# Patient Record
Sex: FEMALE | Race: BLACK OR AFRICAN AMERICAN | NOT HISPANIC OR LATINO | Employment: UNEMPLOYED | URBAN - METROPOLITAN AREA
[De-identification: names, ages, dates, MRNs, and addresses within clinical notes are randomized per-mention and may not be internally consistent; named-entity substitution may affect disease eponyms.]

---

## 2021-05-26 ENCOUNTER — HISTORICAL (OUTPATIENT)
Dept: ADMINISTRATIVE | Facility: HOSPITAL | Age: 26
End: 2021-05-26

## 2021-05-26 LAB
BILIRUB SERPL-MCNC: NEGATIVE MG/DL
BLOOD URINE, POC: NORMAL
CLARITY, POC UA: CLEAR
COLOR, POC UA: NORMAL
GLUCOSE UR QL STRIP: NEGATIVE
KETONES UR QL STRIP: NEGATIVE
LEUKOCYTE EST, POC UA: NEGATIVE
NITRITE, POC UA: NEGATIVE
PH, POC UA: 7
POC BETA-HCG (QUAL): NEGATIVE
PROTEIN, POC: NEGATIVE
SPECIFIC GRAVITY, POC UA: 1.02
UROBILINOGEN, POC UA: NORMAL

## 2021-09-05 ENCOUNTER — HISTORICAL (OUTPATIENT)
Dept: ADMINISTRATIVE | Facility: HOSPITAL | Age: 26
End: 2021-09-05

## 2021-09-05 LAB — SARS-COV-2 RNA RESP QL NAA+PROBE: NOT DETECTED

## 2022-04-10 ENCOUNTER — HISTORICAL (OUTPATIENT)
Dept: ADMINISTRATIVE | Facility: HOSPITAL | Age: 27
End: 2022-04-10

## 2022-04-26 VITALS
OXYGEN SATURATION: 100 % | BODY MASS INDEX: 41.02 KG/M2 | HEIGHT: 63 IN | SYSTOLIC BLOOD PRESSURE: 142 MMHG | DIASTOLIC BLOOD PRESSURE: 86 MMHG | WEIGHT: 231.5 LBS

## 2022-11-16 LAB
C TRACH DNA SPEC QL NAA+PROBE: NEGATIVE
PAP RECOMMENDATION EXT: NORMAL
PAP SMEAR: NORMAL

## 2022-12-19 ENCOUNTER — OFFICE VISIT (OUTPATIENT)
Dept: URGENT CARE | Facility: CLINIC | Age: 27
End: 2022-12-19
Payer: MEDICAID

## 2022-12-19 VITALS
HEART RATE: 97 BPM | SYSTOLIC BLOOD PRESSURE: 118 MMHG | DIASTOLIC BLOOD PRESSURE: 80 MMHG | WEIGHT: 250.38 LBS | BODY MASS INDEX: 44.36 KG/M2 | TEMPERATURE: 98 F | HEIGHT: 63 IN | RESPIRATION RATE: 18 BRPM | OXYGEN SATURATION: 100 %

## 2022-12-19 DIAGNOSIS — J06.9 ACUTE URI: Primary | ICD-10-CM

## 2022-12-19 DIAGNOSIS — R68.89 FLU-LIKE SYMPTOMS: ICD-10-CM

## 2022-12-19 DIAGNOSIS — R05.9 COUGH, UNSPECIFIED TYPE: ICD-10-CM

## 2022-12-19 DIAGNOSIS — Z11.52 ENCOUNTER FOR SCREENING FOR SEVERE ACUTE RESPIRATORY SYNDROME CORONAVIRUS 2 (SARS-COV-2) INFECTION: ICD-10-CM

## 2022-12-19 LAB
CTP QC/QA: YES
CTP QC/QA: YES
FLUAV AG NPH QL: NEGATIVE
FLUBV AG NPH QL: NEGATIVE
SARS-COV-2 RDRP RESP QL NAA+PROBE: NEGATIVE

## 2022-12-19 PROCEDURE — 87635 SARS-COV-2 COVID-19 AMP PRB: CPT | Mod: PBBFAC | Performed by: NURSE PRACTITIONER

## 2022-12-19 PROCEDURE — 99213 OFFICE O/P EST LOW 20 MIN: CPT | Mod: S$PBB,,, | Performed by: NURSE PRACTITIONER

## 2022-12-19 PROCEDURE — 87804 INFLUENZA ASSAY W/OPTIC: CPT | Mod: PBBFAC | Performed by: NURSE PRACTITIONER

## 2022-12-19 PROCEDURE — 99213 PR OFFICE/OUTPT VISIT, EST, LEVL III, 20-29 MIN: ICD-10-PCS | Mod: S$PBB,,, | Performed by: NURSE PRACTITIONER

## 2022-12-19 PROCEDURE — 99214 OFFICE O/P EST MOD 30 MIN: CPT | Mod: PBBFAC | Performed by: NURSE PRACTITIONER

## 2022-12-19 RX ORDER — FLUTICASONE PROPIONATE 50 MCG
2 SPRAY, SUSPENSION (ML) NASAL DAILY
Qty: 9.9 ML | Refills: 0 | Status: SHIPPED | OUTPATIENT
Start: 2022-12-19 | End: 2023-04-06

## 2022-12-19 RX ORDER — PROMETHAZINE HYDROCHLORIDE AND DEXTROMETHORPHAN HYDROBROMIDE 6.25; 15 MG/5ML; MG/5ML
5 SYRUP ORAL EVERY 6 HOURS PRN
Qty: 100 ML | Refills: 0 | Status: SHIPPED | OUTPATIENT
Start: 2022-12-19 | End: 2023-03-18

## 2022-12-19 NOTE — LETTER
December 19, 2022      [facility]  [address] [address]  Phone: [phone]       Patient: Janet Frank   YOB: 1995  Date of Visit: 12/19/2022    To Whom It May Concern:    Ubaldo Frank  was at [facility] on 12/19/2022. The patient may return to work/school on 12/21/22 with no restrictions. If you have any questions or concerns, or if I can be of further assistance, please do not hesitate to contact me.    Sincerely,    MICHELLE HERRERA

## 2022-12-19 NOTE — PROGRESS NOTES
"Subjective:       Patient ID: Janet Frank is a 27 y.o. female.    Vitals:  height is 5' 3" (1.6 m) and weight is 113.6 kg (250 lb 6.4 oz). Her oral temperature is 98.1 °F (36.7 °C). Her blood pressure is 118/80 and her pulse is 97. Her respiration is 18 and oxygen saturation is 100%.     Chief Complaint: Cough (x3days), Generalized Body Aches (x3days), Nausea (x3days), Headache (x3days), and Insomnia (x3days)    CC as above. Denies any chance of pregnancy, vomiting, diarrhea, abd pain.       Constitution: Negative.   HENT:  Positive for congestion.    Neck: neck negative.   Cardiovascular: Negative.    Respiratory:  Positive for cough.    Gastrointestinal:  Positive for nausea.     Objective:      Physical Exam   Constitutional: She is oriented to person, place, and time. She appears well-developed.   HENT:   Head: Normocephalic.   Ears:   Right Ear: Tympanic membrane normal.   Left Ear: Tympanic membrane normal.   Nose: Congestion present.   Mouth/Throat: Oropharynx is clear.   Eyes: Conjunctivae and EOM are normal. Pupils are equal, round, and reactive to light.   Neck: Neck supple.   Cardiovascular: Normal rate, regular rhythm and normal heart sounds.   Pulmonary/Chest: Effort normal and breath sounds normal.   Abdominal: Bowel sounds are normal. Soft. There is no abdominal tenderness. There is no rebound, no left CVA tenderness and no right CVA tenderness.   Musculoskeletal: Normal range of motion.         General: Normal range of motion.   Neurological: She is alert and oriented to person, place, and time.   Skin: Skin is warm and dry. Capillary refill takes less than 2 seconds.   Psychiatric: Her behavior is normal.   Vitals reviewed.      Assessment:       1. Acute URI    2. Encounter for screening for severe acute respiratory syndrome coronavirus 2 (SARS-CoV-2) infection    3. Flu-like symptoms    4. Cough, unspecified type              Office Visit on 12/19/2022   Component Date Value Ref Range Status    " POC Rapid COVID 12/19/2022 Negative  Negative Final     Acceptable 12/19/2022 Yes   Final    Rapid Influenza A Ag 12/19/2022 Negative  Negative Final    Rapid Influenza B Ag 12/19/2022 Negative  Negative Final     Acceptable 12/19/2022 Yes   Final        No results found.   Plan:         Medication as ordered. May use humidifier.  If any shortness of breath, wheezing, continued fevers or any new symptoms then immediately go to ER.      Acute URI  -     fluticasone propionate (FLONASE) 50 mcg/actuation nasal spray; 2 sprays (100 mcg total) by Each Nostril route once daily.  Dispense: 9.9 mL; Refill: 0    Encounter for screening for severe acute respiratory syndrome coronavirus 2 (SARS-CoV-2) infection  -     POCT COVID-19 Rapid Screening    Flu-like symptoms  -     POCT Influenza A/B    Cough, unspecified type  -     promethazine-dextromethorphan (PROMETHAZINE-DM) 6.25-15 mg/5 mL Syrp; Take 5 mLs by mouth every 6 (six) hours as needed (cough).  Dispense: 100 mL; Refill: 0

## 2023-03-18 ENCOUNTER — HOSPITAL ENCOUNTER (EMERGENCY)
Facility: HOSPITAL | Age: 28
Discharge: HOME | End: 2023-03-18
Attending: EMERGENCY MEDICINE
Payer: MEDICAID

## 2023-03-18 VITALS
DIASTOLIC BLOOD PRESSURE: 87 MMHG | WEIGHT: 251.13 LBS | RESPIRATION RATE: 17 BRPM | OXYGEN SATURATION: 99 % | BODY MASS INDEX: 44.5 KG/M2 | HEIGHT: 63 IN | HEART RATE: 97 BPM | TEMPERATURE: 97 F | SYSTOLIC BLOOD PRESSURE: 130 MMHG

## 2023-03-18 DIAGNOSIS — J06.9 VIRAL URI WITH COUGH: Primary | ICD-10-CM

## 2023-03-18 LAB
B-HCG UR QL: NEGATIVE
CTP QC/QA: YES
FLUAV AG UPPER RESP QL IA.RAPID: NOT DETECTED
FLUBV AG UPPER RESP QL IA.RAPID: NOT DETECTED
SARS-COV-2 RNA RESP QL NAA+PROBE: NOT DETECTED

## 2023-03-18 PROCEDURE — 99284 EMERGENCY DEPT VISIT MOD MDM: CPT

## 2023-03-18 PROCEDURE — 0240U COVID/FLU A&B PCR: CPT | Performed by: PHYSICIAN ASSISTANT

## 2023-03-18 PROCEDURE — 63600175 PHARM REV CODE 636 W HCPCS: Performed by: PHYSICIAN ASSISTANT

## 2023-03-18 PROCEDURE — 81025 URINE PREGNANCY TEST: CPT | Performed by: PHYSICIAN ASSISTANT

## 2023-03-18 PROCEDURE — 25000003 PHARM REV CODE 250: Performed by: PHYSICIAN ASSISTANT

## 2023-03-18 PROCEDURE — 96372 THER/PROPH/DIAG INJ SC/IM: CPT | Performed by: PHYSICIAN ASSISTANT

## 2023-03-18 RX ORDER — KETOROLAC TROMETHAMINE 30 MG/ML
30 INJECTION, SOLUTION INTRAMUSCULAR; INTRAVENOUS
Status: COMPLETED | OUTPATIENT
Start: 2023-03-18 | End: 2023-03-18

## 2023-03-18 RX ORDER — PROMETHAZINE HYDROCHLORIDE AND DEXTROMETHORPHAN HYDROBROMIDE 6.25; 15 MG/5ML; MG/5ML
5 SYRUP ORAL EVERY 6 HOURS PRN
Qty: 100 ML | Refills: 0 | Status: SHIPPED | OUTPATIENT
Start: 2023-03-18 | End: 2023-03-23

## 2023-03-18 RX ORDER — CETIRIZINE HYDROCHLORIDE 10 MG/1
10 TABLET ORAL DAILY
Qty: 30 TABLET | Refills: 0 | Status: SHIPPED | OUTPATIENT
Start: 2023-03-18 | End: 2023-04-06

## 2023-03-18 RX ORDER — ONDANSETRON 4 MG/1
4 TABLET, ORALLY DISINTEGRATING ORAL
Status: COMPLETED | OUTPATIENT
Start: 2023-03-18 | End: 2023-03-18

## 2023-03-18 RX ORDER — INDOMETHACIN 50 MG/1
50 CAPSULE ORAL 3 TIMES DAILY PRN
Qty: 20 CAPSULE | Refills: 0 | Status: SHIPPED | OUTPATIENT
Start: 2023-03-18 | End: 2023-04-06

## 2023-03-18 RX ADMIN — KETOROLAC TROMETHAMINE 30 MG: 30 INJECTION, SOLUTION INTRAMUSCULAR at 11:03

## 2023-03-18 RX ADMIN — ONDANSETRON 4 MG: 4 TABLET, ORALLY DISINTEGRATING ORAL at 11:03

## 2023-03-18 NOTE — DISCHARGE INSTRUCTIONS
Report to Emergency Department if symptoms return or worsen; [facility] Within 1 to 2 days, It is important that you follow up with your primary care provider or specialist if indicated for further evaluation, workup, and treatment as necessary. The exam and treatment you received in Emergency Department was for an urgent problem and NOT INTENDED AS COMPLETE CARE. It is important that you FOLLOW UP with a doctor for ongoing care. If your symptoms become WORSE or you DO NOT IMPROVE and you are unable to reach your health care provider, you should RETURN to the Emergency Department. The Emergency Department provider has provided a PRELIMINARY INTERPRETATION of all your studies. A final interpretation may be done after you are discharged. If a change in your diagnosis or treatment is needed WE WILL CONTACT YOU. It is critical that we have a CURRENT PHONE NUMBER FOR YOU.

## 2023-03-18 NOTE — Clinical Note
"Janet Harrisonjona Frank was seen and treated in our emergency department on 3/18/2023.  She may return to work on 03/21/2023.       If you have any questions or concerns, please don't hesitate to call.      LUCAS Molina"

## 2023-03-18 NOTE — ED PROVIDER NOTES
"Encounter Date: 3/18/2023       History     Chief Complaint   Patient presents with    Nasal Congestion     Patient in with c/o nasal congestion, headache, body aches, and "vomiting yellow". Afebrile. No resp distress noted. Mild cough present.     26 yo F w/ PMHx significant for smoking presents to ED c/o 1 day hx of congestion, rhinorrhea, post-nasal drainage, productive cough, HA, body aches & N/V. Denies known sick contacts. Has not tried any meds at home. Denies abdominal pain, diarrhea, constipation, blood in stool, jaundice, dysuria, hematuria, vaginal bleeding, vaginal discharge, sore throat, hemoptysis, wheezing, SOB, vision changes, HA, focal weakness, numbness, F/C, generalized weakness, fatigue, appetite changes, back pain. VSS on arrival, patient in NAD.    Review of patient's allergies indicates:   Allergen Reactions    Benadryl allergy decongestant Hives     No past medical history on file.  No past surgical history on file.  No family history on file.  Social History     Tobacco Use    Smoking status: Every Day     Types: Vaping with nicotine    Smokeless tobacco: Never   Substance Use Topics    Alcohol use: Not Currently    Drug use: Never     Review of Systems   HENT:  Positive for sinus pressure. Negative for ear discharge and ear pain.    Cardiovascular:  Negative for chest pain.   Musculoskeletal:  Negative for neck pain and neck stiffness.   Skin:  Negative for color change, pallor and rash.   Allergic/Immunologic: Negative for immunocompromised state.   Psychiatric/Behavioral:  Negative for confusion.    All other systems reviewed and are negative.    Physical Exam     Initial Vitals [03/18/23 1015]   BP Pulse Resp Temp SpO2   130/87 97 17 97 °F (36.1 °C) 99 %      MAP       --         Physical Exam    Nursing note and vitals reviewed.  Constitutional: She appears well-developed and well-nourished. She is not diaphoretic. No distress.   HENT:   Head: Normocephalic and atraumatic.   Right Ear: " Hearing, tympanic membrane, external ear and ear canal normal.   Left Ear: Hearing, tympanic membrane, external ear and ear canal normal.   Nose: Mucosal edema and rhinorrhea present. No epistaxis. Right sinus exhibits maxillary sinus tenderness. Right sinus exhibits no frontal sinus tenderness. Left sinus exhibits maxillary sinus tenderness. Left sinus exhibits no frontal sinus tenderness.   Mouth/Throat: Uvula is midline, oropharynx is clear and moist and mucous membranes are normal. No trismus in the jaw. No uvula swelling. No oropharyngeal exudate, posterior oropharyngeal edema, posterior oropharyngeal erythema or tonsillar abscesses.   Eyes: Conjunctivae and EOM are normal. Pupils are equal, round, and reactive to light. No scleral icterus.   Neck: Neck supple. No JVD present.   Normal range of motion.   Full passive range of motion without pain.     Cardiovascular:  Normal rate, regular rhythm, normal heart sounds and intact distal pulses.     Exam reveals no gallop and no friction rub.       No murmur heard.  Pulmonary/Chest: Breath sounds normal. No stridor. No respiratory distress. She has no wheezes. She has no rhonchi. She has no rales.   Abdominal: Abdomen is soft. Bowel sounds are normal. She exhibits no distension and no mass. There is no abdominal tenderness.   No right CVA tenderness.  No left CVA tenderness. There is no rebound, no guarding, no tenderness at McBurney's point and negative Berrios's sign. negative Rovsing's sign  Musculoskeletal:         General: No tenderness or edema. Normal range of motion.      Cervical back: Full passive range of motion without pain, normal range of motion and neck supple. No rigidity.     Lymphadenopathy:     She has no cervical adenopathy.   Neurological: She is alert and oriented to person, place, and time. She has normal strength. No cranial nerve deficit or sensory deficit.   Skin: Skin is warm and dry. Capillary refill takes less than 2 seconds. No rash  noted. No erythema. No pallor.   Psychiatric: She has a normal mood and affect. Thought content normal.       ED Course   Procedures  Labs Reviewed   COVID/FLU A&B PCR - Normal    Narrative:     The Xpert Xpress SARS-CoV-2/FLU/RSV plus is a rapid, multiplexed real-time PCR test intended for the simultaneous qualitative detection and differentiation of SARS-CoV-2, Influenza A, Influenza B, and respiratory syncytial virus (RSV) viral RNA in either nasopharyngeal swab or nasal swab specimens.         POCT URINE PREGNANCY          Imaging Results    None          Medications   ketorolac injection 30 mg (30 mg Intramuscular Given 3/18/23 1111)   ondansetron disintegrating tablet 4 mg (4 mg Oral Given 3/18/23 1111)     Medical Decision Making:   Clinical Tests:   Lab Tests: Ordered and Reviewed  All swabs negative. Lungs CTA throughout w/o signs of respiratory distress or abnormal vitals, no indication for CXR at this time. Patient is non-toxic appearing, stable for discharge. Will discharge w/ meds for symptom relief. Referral to IM clinic for follow-up. ED precautions given for new or worsening symptoms.                        Clinical Impression:   Final diagnoses:  [J06.9] Viral URI with cough (Primary)        ED Disposition Condition    Discharge Good          ED Prescriptions       Medication Sig Dispense Start Date End Date Auth. Provider    indomethacin (INDOCIN) 50 MG capsule Take 1 capsule (50 mg total) by mouth 3 (three) times daily as needed (Pain). Take with food 20 capsule 3/18/2023 -- LUCAS Molina    promethazine-dextromethorphan (PROMETHAZINE-DM) 6.25-15 mg/5 mL Syrp Take 5 mLs by mouth every 6 (six) hours as needed (cough). 100 mL 3/18/2023 3/23/2023 LUCAS Molina    cetirizine (ZYRTEC) 10 MG tablet Take 1 tablet (10 mg total) by mouth once daily. 30 tablet 3/18/2023 3/17/2024 LUCAS Molina          Follow-up Information       Follow up With Specialties Details Why Contact Info Additional  Information    [facility] Internal Medicine In 2 weeks  [address] [address] [phone] Internal Medicine Clinic Entrance #1    [facility] Emergency Medicine  As needed, If symptoms worsen [address] [address] [phone]              LUCAS Molina  03/18/23 1215

## 2023-04-06 ENCOUNTER — HOSPITAL ENCOUNTER (OUTPATIENT)
Dept: RADIOLOGY | Facility: HOSPITAL | Age: 28
Discharge: HOME | End: 2023-04-06
Attending: NURSE PRACTITIONER
Payer: MEDICAID

## 2023-04-06 ENCOUNTER — OFFICE VISIT (OUTPATIENT)
Dept: URGENT CARE | Facility: CLINIC | Age: 28
End: 2023-04-06
Payer: MEDICAID

## 2023-04-06 VITALS
TEMPERATURE: 98 F | OXYGEN SATURATION: 100 % | HEART RATE: 91 BPM | HEIGHT: 63 IN | WEIGHT: 253 LBS | RESPIRATION RATE: 18 BRPM | DIASTOLIC BLOOD PRESSURE: 80 MMHG | SYSTOLIC BLOOD PRESSURE: 115 MMHG | BODY MASS INDEX: 44.83 KG/M2
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
## 2023-04-06 LAB
B-HCG UR QL: NEGATIVE
CTP QC/QA: YES

## 2023-04-06 PROCEDURE — 73030 X-RAY EXAM OF SHOULDER: CPT | Mod: TC,RT

## 2023-04-06 PROCEDURE — 73000 X-RAY EXAM OF COLLAR BONE: CPT | Mod: TC,RT

## 2023-04-06 PROCEDURE — 81025 URINE PREGNANCY TEST: CPT | Mod: PBBFAC | Performed by: NURSE PRACTITIONER

## 2023-04-06 PROCEDURE — 99213 PR OFFICE/OUTPT VISIT, EST, LEVL III, 20-29 MIN: ICD-10-PCS | Mod: S$PBB,,, | Performed by: NURSE PRACTITIONER

## 2023-04-06 PROCEDURE — 99214 OFFICE O/P EST MOD 30 MIN: CPT | Mod: PBBFAC | Performed by: NURSE PRACTITIONER

## 2023-04-06 PROCEDURE — 99213 OFFICE O/P EST LOW 20 MIN: CPT | Mod: S$PBB,,, | Performed by: NURSE PRACTITIONER

## 2023-04-06 RX ORDER — DICLOFENAC SODIUM 75 MG/1
75 TABLET, DELAYED RELEASE ORAL 2 TIMES DAILY
Qty: 20 TABLET | Refills: 0 | Status: SHIPPED | OUTPATIENT
Start: 2023-04-06 | End: 2023-04-24

## 2023-04-06 RX ORDER — METHOCARBAMOL 500 MG/1
500 TABLET, FILM COATED ORAL 4 TIMES DAILY
Qty: 40 TABLET | Refills: 0 | Status: SHIPPED | OUTPATIENT
Start: 2023-04-06 | End: 2023-04-24

## 2023-04-06 NOTE — PATIENT INSTRUCTIONS
You are having whiplash. This is a muscle strain. It will usually feel worse before it feels better.  Use Rx meds as directed.  Practice light range of motion often.  Apply heat.  Epsom salt soak.  You may take up to 1,000mg Tylenol at one time up to 4x in 24 hours.
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
## 2023-04-24 ENCOUNTER — OFFICE VISIT (OUTPATIENT)
Dept: INTERNAL MEDICINE | Facility: CLINIC | Age: 28
End: 2023-04-24
Payer: MEDICAID

## 2023-04-24 VITALS
BODY MASS INDEX: 44.94 KG/M2 | TEMPERATURE: 98 F | HEART RATE: 86 BPM | WEIGHT: 253.63 LBS | DIASTOLIC BLOOD PRESSURE: 84 MMHG | SYSTOLIC BLOOD PRESSURE: 122 MMHG | RESPIRATION RATE: 18 BRPM | HEIGHT: 63 IN

## 2023-04-24 DIAGNOSIS — E66.01 CLASS 3 SEVERE OBESITY DUE TO EXCESS CALORIES WITH SERIOUS COMORBIDITY AND BODY MASS INDEX (BMI) OF 40.0 TO 44.9 IN ADULT: Chronic | ICD-10-CM

## 2023-04-24 DIAGNOSIS — K21.9 GASTROESOPHAGEAL REFLUX DISEASE, UNSPECIFIED WHETHER ESOPHAGITIS PRESENT: Chronic | ICD-10-CM

## 2023-04-24 DIAGNOSIS — Z00.00 WELLNESS EXAMINATION: ICD-10-CM

## 2023-04-24 DIAGNOSIS — G89.29 CHRONIC NONINTRACTABLE HEADACHE, UNSPECIFIED HEADACHE TYPE: Primary | ICD-10-CM

## 2023-04-24 DIAGNOSIS — R51.9 CHRONIC NONINTRACTABLE HEADACHE, UNSPECIFIED HEADACHE TYPE: Primary | ICD-10-CM

## 2023-04-24 PROBLEM — E66.813 CLASS 3 SEVERE OBESITY DUE TO EXCESS CALORIES WITH SERIOUS COMORBIDITY AND BODY MASS INDEX (BMI) OF 40.0 TO 44.9 IN ADULT: Chronic | Status: ACTIVE | Noted: 2023-04-24

## 2023-04-24 LAB
APPEARANCE UR: CLEAR
BACTERIA #/AREA URNS AUTO: ABNORMAL /HPF
BILIRUB UR QL STRIP.AUTO: NEGATIVE MG/DL
COLOR UR AUTO: ABNORMAL
GLUCOSE UR QL STRIP.AUTO: NORMAL MG/DL
HYALINE CASTS #/AREA URNS LPF: ABNORMAL /LPF
KETONES UR QL STRIP.AUTO: NEGATIVE MG/DL
LEUKOCYTE ESTERASE UR QL STRIP.AUTO: NEGATIVE UNIT/L
MUCOUS THREADS URNS QL MICRO: ABNORMAL /LPF
NITRITE UR QL STRIP.AUTO: NEGATIVE
PH UR STRIP.AUTO: 5.5 [PH]
PROT UR QL STRIP.AUTO: NEGATIVE MG/DL
RBC #/AREA URNS AUTO: ABNORMAL /HPF
RBC UR QL AUTO: NEGATIVE UNIT/L
SP GR UR STRIP.AUTO: 1.03
SQUAMOUS #/AREA URNS LPF: ABNORMAL /HPF
UROBILINOGEN UR STRIP-ACNC: NORMAL MG/DL
WBC #/AREA URNS AUTO: ABNORMAL /HPF

## 2023-04-24 PROCEDURE — 3008F PR BODY MASS INDEX (BMI) DOCUMENTED: ICD-10-PCS | Mod: CPTII,,, | Performed by: NURSE PRACTITIONER

## 2023-04-24 PROCEDURE — 3074F PR MOST RECENT SYSTOLIC BLOOD PRESSURE < 130 MM HG: ICD-10-PCS | Mod: CPTII,,, | Performed by: NURSE PRACTITIONER

## 2023-04-24 PROCEDURE — 99214 OFFICE O/P EST MOD 30 MIN: CPT | Mod: S$PBB,,, | Performed by: NURSE PRACTITIONER

## 2023-04-24 PROCEDURE — 1160F RVW MEDS BY RX/DR IN RCRD: CPT | Mod: CPTII,,, | Performed by: NURSE PRACTITIONER

## 2023-04-24 PROCEDURE — 1159F PR MEDICATION LIST DOCUMENTED IN MEDICAL RECORD: ICD-10-PCS | Mod: CPTII,,, | Performed by: NURSE PRACTITIONER

## 2023-04-24 PROCEDURE — 1159F MED LIST DOCD IN RCRD: CPT | Mod: CPTII,,, | Performed by: NURSE PRACTITIONER

## 2023-04-24 PROCEDURE — 3079F DIAST BP 80-89 MM HG: CPT | Mod: CPTII,,, | Performed by: NURSE PRACTITIONER

## 2023-04-24 PROCEDURE — 1160F PR REVIEW ALL MEDS BY PRESCRIBER/CLIN PHARMACIST DOCUMENTED: ICD-10-PCS | Mod: CPTII,,, | Performed by: NURSE PRACTITIONER

## 2023-04-24 PROCEDURE — 81001 URINALYSIS AUTO W/SCOPE: CPT | Performed by: NURSE PRACTITIONER

## 2023-04-24 PROCEDURE — 3074F SYST BP LT 130 MM HG: CPT | Mod: CPTII,,, | Performed by: NURSE PRACTITIONER

## 2023-04-24 PROCEDURE — 3079F PR MOST RECENT DIASTOLIC BLOOD PRESSURE 80-89 MM HG: ICD-10-PCS | Mod: CPTII,,, | Performed by: NURSE PRACTITIONER

## 2023-04-24 PROCEDURE — 99214 PR OFFICE/OUTPT VISIT, EST, LEVL IV, 30-39 MIN: ICD-10-PCS | Mod: S$PBB,,, | Performed by: NURSE PRACTITIONER

## 2023-04-24 PROCEDURE — 99214 OFFICE O/P EST MOD 30 MIN: CPT | Mod: PBBFAC | Performed by: NURSE PRACTITIONER

## 2023-04-24 PROCEDURE — 3008F BODY MASS INDEX DOCD: CPT | Mod: CPTII,,, | Performed by: NURSE PRACTITIONER

## 2023-04-24 RX ORDER — SUMATRIPTAN SUCCINATE 25 MG/1
25 TABLET ORAL EVERY 6 HOURS PRN
Qty: 60 TABLET | Refills: 2 | Status: SHIPPED | OUTPATIENT
Start: 2023-04-24

## 2023-04-24 RX ORDER — PANTOPRAZOLE SODIUM 40 MG/1
40 TABLET, DELAYED RELEASE ORAL DAILY
Qty: 90 TABLET | Refills: 1 | Status: SHIPPED | OUTPATIENT
Start: 2023-04-24 | End: 2024-02-08

## 2023-04-24 NOTE — PROGRESS NOTES
Patient ID: [id number]     Chief Complaint: Establish Care, Gastroesophageal Reflux, and Headache (States has heartburn for years.)    HPI:     Janet Frank is a 27 y.o. female with diagnosis of chronic headaches, obesity. Patient seen in clinic today to establish care.   Patient states chronic headaches since she was 17 y/o, was followed by Neurologist at [facility] but was unable to find out cause of headaches. Patient states headaches daily, states headaches top/side of head and radiates to back of head. Patient denies N/V/change in vision. States taking Ibuprofen with mild relief. Patient states previous prescribed Fioricet with mild relief. Patient states previous MRI, CT completed at [facility] in [place], [place]. Will try to obtain records.   Patient also states heartburn, worse at night. Patient denies abdominal pain, N/V/D/C, weight loss.   Patient denies any other acute complaints.     Review of patient's allergies indicates:   Allergen Reactions    Benadryl allergy decongestant Hives     Breast Cancer Screening: deferred due to age  Cervical Cancer Screening: followed by [facility]  Colorectal Cancer Screening: deferred due to age  Diabetic Eye Exam: N/A  Diabetic Foot Exam: N/A  Lung Cancer Screening: N/A  Prostate Cancer Screening: N/A  AAA Screening: N/A  Osteoporosis Screening: deferred due to age  Medicare Wellness: N/A  Immunizations:   Immunization History   Administered Date(s) Administered    COVID-19, MRNA, LN-S, PF (Pfizer) (Purple Cap) 04/26/2021, 05/17/2021     History reviewed. No pertinent surgical history.    family history includes Breast cancer in her mother; Cervical cancer in her maternal grandmother; No Known Problems in her father.    Social History     Socioeconomic History    Marital status: Significant Other   Tobacco Use    Smoking status: Every Day     Types: Vaping w/o nicotine     Start date: 03/2022    Smokeless tobacco: Never   Substance and Sexual Activity    Alcohol use: Yes      "Comment: occassionally    Drug use: Never    Sexual activity: Yes     Partners: Male     Current Outpatient Medications   Medication Instructions    pantoprazole (PROTONIX) 40 mg, Oral, Daily    sumatriptan (IMITREX) 25 mg, Oral, Every 6 hours PRN       Subjective:     Review of Systems   Constitutional: Negative.    HENT: Negative.     Eyes: Negative.    Respiratory: Negative.     Cardiovascular: Negative.    Gastrointestinal: Negative.    Endocrine: Negative.    Genitourinary: Negative.    Musculoskeletal: Negative.    Skin: Negative.    Allergic/Immunologic: Negative.    Neurological:  Positive for headaches.   Hematological: Negative.    Psychiatric/Behavioral: Negative.       Objective:     Visit Vitals  /84 (BP Location: Right arm, Patient Position: Sitting, BP Method: Large (Automatic))   Pulse 86   Temp 97.7 °F (36.5 °C) (Oral)   Resp 18   Ht 5' 2.99" (1.6 m)   Wt 115 kg (253 lb 9.6 oz)   LMP 03/21/2023 (Exact Date)   BMI 44.93 kg/m²     Physical Exam  Vitals reviewed.   Constitutional:       Appearance: Normal appearance. She is obese.   HENT:      Head: Normocephalic and atraumatic.      Mouth/Throat:      Mouth: Mucous membranes are moist.      Pharynx: Oropharynx is clear.   Eyes:      Extraocular Movements: Extraocular movements intact.      Conjunctiva/sclera: Conjunctivae normal.      Pupils: Pupils are equal, round, and reactive to light.   Cardiovascular:      Rate and Rhythm: Normal rate and regular rhythm.      Heart sounds: Normal heart sounds.   Pulmonary:      Effort: Pulmonary effort is normal.      Breath sounds: Normal breath sounds.   Abdominal:      General: Bowel sounds are normal.   Musculoskeletal:         General: Normal range of motion.      Cervical back: Normal range of motion.   Skin:     General: Skin is warm and dry.   Neurological:      Mental Status: She is alert and oriented to person, place, and time.   Psychiatric:         Mood and Affect: Mood normal.         " Behavior: Behavior normal.       Labs Reviewed:     Hematology:  Lab Results   Component Value Date    WBC 10.4 04/24/2023    HGB 12.6 04/24/2023    HCT 38.6 04/24/2023     04/24/2023     Chemistry:  Lab Results   Component Value Date     04/24/2023    K 3.8 04/24/2023    CHLORIDE 105 04/24/2023    BUN 13.5 04/24/2023    CREATININE 0.77 04/24/2023    EGFRNORACEVR >60 04/24/2023    GLUCOSE 120 (H) 04/24/2023    CALCIUM 9.4 04/24/2023    ALKPHOS 72 04/24/2023    LABPROT 7.7 04/24/2023    ALBUMIN 3.7 04/24/2023    BILIDIR 0.1 10/05/2020    IBILI 0.10 10/05/2020    AST 16 04/24/2023    ALT 22 04/24/2023    RNUCPAKK55CV 9.7 (L) 04/24/2023      Lab Results   Component Value Date    HGBA1C 5.8 04/24/2023     Lipid Panel:  Lab Results   Component Value Date    CHOL 182 04/24/2023    HDL 33 (L) 04/24/2023    .00 04/24/2023    TRIG 128 04/24/2023    TOTALCHOLEST 6 (H) 04/24/2023     Thyroid:  Lab Results   Component Value Date    TSH 1.680 04/24/2023     Urine:  Lab Results   Component Value Date    APPEARANCEUA CLEAR 10/05/2020    PROTEINUA Negative 10/05/2020    LEUKOCYTESUR Negative 05/26/2021    RBCUA 76 (H) 03/02/2020    WBCUA 95 (H) 03/02/2020    BACTERIA 1+ (A) 03/02/2020    SQEPUA  (A) 06/29/2019    HYALINECASTS 0-2 (A) 06/29/2019        Assessment:       ICD-10-CM ICD-9-CM   1. Chronic nonintractable headache, unspecified headache type  R51.9 784.0    G89.29    2. Gastroesophageal reflux disease, unspecified whether esophagitis present  K21.9 530.81   3. Class 3 severe obesity due to excess calories with serious comorbidity and body mass index (BMI) of 40.0 to 44.9 in adult  E66.01 278.01    Z68.41 V85.41   4. Wellness examination  Z00.00 V70.0        Plan:     1. Chronic nonintractable headache, unspecified headache type  Start Imitrex 25 mg Q6h prn headaches    2. Gastroesophageal reflux disease, unspecified whether esophagitis present  Start Protonix 40 mg daily  Avoid spicy foods  Remain  in an upright position for at least 30 minutes after consuming meals    3. Class 3 severe obesity due to excess calories with serious comorbidity and body mass index (BMI) of 40.0 to 44.9 in adult  Body mass index is 44.93 kg/m².  Thyroid Stimulating Hormone   Date Value Ref Range Status   04/24/2023 1.680 0.350 - 4.940 uIU/mL Final     Vit D 25 OH   Date Value Ref Range Status   04/24/2023 9.7 (L) 30.0 - 80.0 ng/mL Final     Hemoglobin A1c   Date Value Ref Range Status   04/24/2023 5.8 <=7.0 % Final   Sleep Study: denies  Weight Loss Encouraged  Increase Physical Activity    - CBC Auto Differential; Future  - Comprehensive Metabolic Panel; Future  - Hemoglobin A1C; Future  - Lipid Panel; Future  - Urinalysis; Future  - TSH; Future  - Vitamin D; Future  - Urinalysis      Follow up in about 2 weeks (around 5/8/2023) for Labs, Virtual Visit. In addition to their scheduled follow up, the patient has also been instructed to follow up on as needed basis.     Mohini Sibley, MICHELLE

## 2023-04-28 ENCOUNTER — DOCUMENTATION ONLY (OUTPATIENT)
Dept: INTERNAL MEDICINE | Facility: CLINIC | Age: 28
End: 2023-04-28
Payer: MEDICAID

## 2023-06-27 ENCOUNTER — PATIENT MESSAGE (OUTPATIENT)
Dept: RESEARCH | Facility: HOSPITAL | Age: 28
End: 2023-06-27
Payer: MEDICAID

## 2023-07-11 ENCOUNTER — PATIENT MESSAGE (OUTPATIENT)
Dept: RESEARCH | Facility: HOSPITAL | Age: 28
End: 2023-07-11
Payer: MEDICAID

## 2023-07-19 ENCOUNTER — PATIENT MESSAGE (OUTPATIENT)
Dept: RESEARCH | Facility: HOSPITAL | Age: 28
End: 2023-07-19
Payer: MEDICAID

## 2023-10-17 RX ORDER — PANTOPRAZOLE SODIUM 40 MG/1
40 TABLET, DELAYED RELEASE ORAL DAILY
Qty: 90 TABLET | Refills: 1 | OUTPATIENT
Start: 2023-10-17

## 2023-11-28 ENCOUNTER — TELEPHONE (OUTPATIENT)
Dept: FAMILY MEDICINE | Facility: CLINIC | Age: 28
End: 2023-11-28
Payer: MEDICAID

## 2023-11-28 ENCOUNTER — HOSPITAL ENCOUNTER (EMERGENCY)
Facility: HOSPITAL | Age: 28
Discharge: HOME | End: 2023-11-28
Attending: INTERNAL MEDICINE
Payer: MEDICAID

## 2023-11-28 ENCOUNTER — E-VISIT (OUTPATIENT)
Dept: FAMILY MEDICINE | Facility: CLINIC | Age: 28
End: 2023-11-28
Payer: MEDICAID

## 2023-11-28 VITALS
HEART RATE: 67 BPM | OXYGEN SATURATION: 100 % | TEMPERATURE: 98 F | HEIGHT: 63 IN | WEIGHT: 239.88 LBS | RESPIRATION RATE: 18 BRPM | SYSTOLIC BLOOD PRESSURE: 139 MMHG | BODY MASS INDEX: 42.5 KG/M2 | DIASTOLIC BLOOD PRESSURE: 89 MMHG

## 2023-11-28 DIAGNOSIS — K52.9 GASTROENTERITIS: ICD-10-CM

## 2023-11-28 DIAGNOSIS — R10.9 ABDOMINAL PAIN, UNSPECIFIED ABDOMINAL LOCATION: Primary | ICD-10-CM

## 2023-11-28 DIAGNOSIS — R07.9 CHEST PAIN, UNSPECIFIED TYPE: Primary | ICD-10-CM

## 2023-11-28 LAB
ABS NEUT CALC (OHS): 4.8 X10(3)/MCL (ref 2.1–9.2)
ALBUMIN SERPL-MCNC: 3.9 G/DL (ref 3.5–5)
ALBUMIN/GLOB SERPL: 1 RATIO (ref 1.1–2)
ALP SERPL-CCNC: 61 UNIT/L (ref 40–150)
ALT SERPL-CCNC: 16 UNIT/L (ref 0–55)
APPEARANCE UR: CLEAR
AST SERPL-CCNC: 17 UNIT/L (ref 5–34)
B-HCG UR QL: NEGATIVE
BACTERIA #/AREA URNS AUTO: ABNORMAL /HPF
BASOPHILS NFR BLD MANUAL: 0.09 X10(3)/MCL (ref 0–0.2)
BASOPHILS NFR BLD MANUAL: 1 % (ref 0–2)
BILIRUB SERPL-MCNC: 0.4 MG/DL
BILIRUB UR QL STRIP.AUTO: NEGATIVE
BUN SERPL-MCNC: 10.4 MG/DL (ref 7–18.7)
CALCIUM SERPL-MCNC: 9 MG/DL (ref 8.4–10.2)
CHLORIDE SERPL-SCNC: 106 MMOL/L (ref 98–107)
CO2 SERPL-SCNC: 26 MMOL/L (ref 22–29)
COLOR UR AUTO: ABNORMAL
CREAT SERPL-MCNC: 0.79 MG/DL (ref 0.55–1.02)
CTP QC/QA: YES
EOSINOPHIL NFR BLD MANUAL: 0.19 X10(3)/MCL (ref 0–0.9)
EOSINOPHIL NFR BLD MANUAL: 2 % (ref 0–8)
ERYTHROCYTE [DISTWIDTH] IN BLOOD BY AUTOMATED COUNT: 14.8 % (ref 11.5–17)
GFR SERPLBLD CREATININE-BSD FMLA CKD-EPI: >60 MLS/MIN/1.73/M2
GLOBULIN SER-MCNC: 3.9 GM/DL (ref 2.4–3.5)
GLUCOSE SERPL-MCNC: 89 MG/DL (ref 74–100)
GLUCOSE UR QL STRIP.AUTO: NORMAL
HCT VFR BLD AUTO: 38.7 % (ref 37–47)
HGB BLD-MCNC: 12.5 G/DL (ref 12–16)
HOLD SPECIMEN: NORMAL
HYALINE CASTS #/AREA URNS LPF: ABNORMAL /LPF
KETONES UR QL STRIP.AUTO: NEGATIVE
LEUKOCYTE ESTERASE UR QL STRIP.AUTO: NEGATIVE
LIPASE SERPL-CCNC: 14 U/L
LYMPHOCYTES NFR BLD MANUAL: 3.86 X10(3)/MCL
LYMPHOCYTES NFR BLD MANUAL: 41 % (ref 13–40)
MCH RBC QN AUTO: 26.1 PG (ref 27–31)
MCHC RBC AUTO-ENTMCNC: 32.3 G/DL (ref 33–36)
MCV RBC AUTO: 80.8 FL (ref 80–94)
MONOCYTES NFR BLD MANUAL: 0.47 X10(3)/MCL (ref 0.1–1.3)
MONOCYTES NFR BLD MANUAL: 5 % (ref 2–11)
NEUTROPHILS NFR BLD MANUAL: 51 % (ref 47–80)
NITRITE UR QL STRIP.AUTO: NEGATIVE
NRBC BLD AUTO-RTO: 0 %
PH UR STRIP.AUTO: 7.5 [PH]
PLATELET # BLD AUTO: 264 X10(3)/MCL (ref 130–400)
PLATELET # BLD EST: ADEQUATE 10*3/UL
PMV BLD AUTO: 10.8 FL (ref 7.4–10.4)
POTASSIUM SERPL-SCNC: 4.1 MMOL/L (ref 3.5–5.1)
PROT SERPL-MCNC: 7.8 GM/DL (ref 6.4–8.3)
PROT UR QL STRIP.AUTO: NEGATIVE
RBC # BLD AUTO: 4.79 X10(6)/MCL (ref 4.2–5.4)
RBC #/AREA URNS AUTO: ABNORMAL /HPF
RBC MORPH BLD: NORMAL
RBC UR QL AUTO: NEGATIVE
SODIUM SERPL-SCNC: 139 MMOL/L (ref 136–145)
SP GR UR STRIP.AUTO: 1.02 (ref 1–1.03)
SQUAMOUS #/AREA URNS LPF: ABNORMAL /HPF
UROBILINOGEN UR STRIP-ACNC: NORMAL
WBC # SPEC AUTO: 9.41 X10(3)/MCL (ref 4.5–11.5)
WBC #/AREA URNS AUTO: ABNORMAL /HPF

## 2023-11-28 PROCEDURE — 80053 COMPREHEN METABOLIC PANEL: CPT | Performed by: INTERNAL MEDICINE

## 2023-11-28 PROCEDURE — 81001 URINALYSIS AUTO W/SCOPE: CPT | Performed by: INTERNAL MEDICINE

## 2023-11-28 PROCEDURE — 81025 URINE PREGNANCY TEST: CPT | Performed by: INTERNAL MEDICINE

## 2023-11-28 PROCEDURE — 85027 COMPLETE CBC AUTOMATED: CPT | Performed by: INTERNAL MEDICINE

## 2023-11-28 PROCEDURE — 99284 EMERGENCY DEPT VISIT MOD MDM: CPT

## 2023-11-28 PROCEDURE — 99499 NO LOS: ICD-10-PCS | Mod: 95,,, | Performed by: PHYSICIAN ASSISTANT

## 2023-11-28 PROCEDURE — 83690 ASSAY OF LIPASE: CPT | Performed by: INTERNAL MEDICINE

## 2023-11-28 PROCEDURE — 99499 UNLISTED E&M SERVICE: CPT | Mod: 95,,, | Performed by: PHYSICIAN ASSISTANT

## 2023-11-28 RX ORDER — PANTOPRAZOLE SODIUM 40 MG/1
TABLET, DELAYED RELEASE ORAL
Qty: 42 TABLET | Refills: 0 | Status: SHIPPED | OUTPATIENT
Start: 2023-11-28 | End: 2023-12-26

## 2023-11-28 RX ORDER — SUCRALFATE 1 G/1
1 TABLET ORAL 4 TIMES DAILY
Qty: 40 TABLET | Refills: 0 | Status: SHIPPED | OUTPATIENT
Start: 2023-11-28 | End: 2023-12-08

## 2023-11-28 NOTE — TELEPHONE ENCOUNTER
----- Message from LUCAS Shipman sent at 11/28/2023  9:17 AM CST -----  This patient tried to schedule an E visit but this is not appropriate.  She needs to be seen in clinic today or tomorrow

## 2023-11-28 NOTE — PROGRESS NOTES
Patient ID: Janet Frank is a 28 y.o. female.    Chief Complaint: Heartburn (Entered automatically based on patient selection in Patient Portal.)    The patient initiated a request through [employer] on 11/28/2023 for evaluation and management with a chief complaint of Heartburn (Entered automatically based on patient selection in Patient Portal.)     I evaluated the questionnaire submission on 11/28/23.    [facility] Peq Evisit Heartburn    11/28/2023  9:09 AM CST - Filed by Patient   Do you agree to participate in an E-Visit? Yes   If you have any of the following symptoms, please present to your local ER or call 911:  I acknowledge   What is the main issue that you would like for your doctor to address today? Stomach in pain, it feels like my stomach ripping   Are you able to take your vital signs? No   Are you currently pregnant, could you be pregnant, or are you breast feeding? None of the above   How long have you had heartburn? A year or more   How often do you experience heartburn? All the time   Where do you feel the heartburn? In the middle of my chest;  In my stomach   How long does your heartburn last? I have it all the time   Does your heartburn wake you from sleep? Yes   Does your heartburn limit your ability to do thing you need to do? No   Does your heartburn change depending on whether you are sitting, standing, or lying down? Yes   Which of the following are you experiencing: Stomach fluid entering the back of your throat;  Fullness in the back of your throat   Do you have any of the following? None of the above   Do you have trouble or pain with  swallowing? No   While eating, do you feel full quicker than usual? Yes   Do you have any of the following? Chest pain with activity;  Trouble breathing with activity   Which of the following makes the heartburn worse? Lying down   Do you have any of the following? Shortness of breath;  Weakness   Have you lost weight lately without trying? No   Have you ever  been told you have the following? None of the above   Have you seen a healthcare provider for your heartburn? I saw someone over a year ago   Have you taken any medicines to relieve your heartburn in the past? Antacids (Tums, Rolaids, Maalox, other);  H2 Blockers (Pepcid, Tagament, Zantac, Axid);  PPIs (Prilosec, Nexium, Prevacid, Prontonix)   Have the medicines provided relief? No   Have you had any of the following for heartburn? None of the above   Provide any information you feel is important to your history not asked above    Please attach any relevant images or files          Recent Labs Obtained:  No visits with results within 7 Day(s) from this visit.   Latest known visit with results is:   Documentation Only on 04/28/2023   Component Date Value Ref Range Status    PAP Recommendation External 11/16/2022 No follow-up frequency specified   Final    Pap 11/16/2022 Negative for intraephithelial lesion or malignancy  Negative for intraephithelial lesion or malignancy, Other Final    Chlamydia DNA Probe w/Rflx 11/16/2022 negative   Final       Encounter Diagnosis   Name Primary?    Chest pain, unspecified type Yes        No orders of the defined types were placed in this encounter.           No follow-ups on file.      E-Visit Time Tracking:    Day 1 Time (in minutes): 5     Total Time (in minutes): 5

## 2023-11-28 NOTE — TELEPHONE ENCOUNTER
Patient was called, patient lives in [place] , patient never seen in this clinic, patient never see dr. Green,   Clinic is not taking any new medicaid patient patient was told to f/u with her PCP

## 2023-11-28 NOTE — TELEPHONE ENCOUNTER
Pt states that she is located in [place]. Pt urged to see Ms. Sibley or urgent care for abd pain; pt verbalized understanding

## 2023-11-28 NOTE — Clinical Note
This patient tried to schedule an E visit but this is not appropriate.  She needs to be seen in clinic today or tomorrow

## 2023-11-28 NOTE — PATIENT INSTRUCTIONS
Jacob Gonzales,     If you are due for any health screening(s) below please notify me so we can arrange them to be ordered and scheduled. Most healthy patients at your age complete them, but you are free to accept or refuse.     If you can't do it, I'll definitely understand. If you can, I'd certainly appreciate it!    Tests to Keep You Healthy    Cervical Cancer Screening: Met on 11/16/2022  Tobacco Cessation: NO      Were here to help you quit smoking     Our records indicated that you are still smoking. One of the best things you can do for your health is to stop smoking and we are here to help.     Talk with your provider about our Smoking Cessation Program and how we can support you on your journey.

## 2023-11-28 NOTE — Clinical Note
"Janet Harrisonjona Frank was seen and treated in our emergency department on 11/28/2023.  She may return to work on 11/29/2023.       If you have any questions or concerns, please don't hesitate to call.      Garrett Hernández MD"

## 2023-11-28 NOTE — TELEPHONE ENCOUNTER
Understood, I would also suggest if she can not get in with her primary care to go to an urgent care or the emergency room if she is having such severe symptoms.

## 2023-11-28 NOTE — TELEPHONE ENCOUNTER
Pt has been scheduled for 01/04/2023, pt was also informed that if the pain got any worse to go to the ER. LD

## 2023-11-28 NOTE — ED PROVIDER NOTES
"Encounter Date: 11/28/2023       History     Chief Complaint   Patient presents with    Abdominal Pain     Epigastric abdominal pain started last night, creeps up esophagus. This morning, reporting "tearing" abdominal pain with right leg numbness.     29 yo F w/ sig PMH of GERD presenting to the ED due to x1 day hx of abdominal pain. States that the pain started suddenly yesterday while at work. It was initially acid reflux which then progressively worsened and went down to her abdomin. Describes the pain as sharp and tearing. States that it is primarily located in epigastric and midline abdomen. Pain worsened after eating food, nothing has made the pain better. Patient states she has had some nausea associated with the pain as well as right anterior and lateral thigh decrease in sensation. She has never had this pain before. Patient is vitally stable in the ED.    The history is provided by the patient.     Review of patient's allergies indicates:   Allergen Reactions    Benadryl allergy decongestant Hives     Past Medical History:   Diagnosis Date    GERD (gastroesophageal reflux disease)      History reviewed. No pertinent surgical history.  Family History   Problem Relation Age of Onset    Breast cancer Mother     No Known Problems Father     Cervical cancer Maternal Grandmother      Social History     Tobacco Use    Smoking status: Every Day     Types: Vaping w/o nicotine     Start date: 03/2022    Smokeless tobacco: Never   Substance Use Topics    Alcohol use: Yes     Comment: occassionally    Drug use: Never     Review of Systems   Constitutional:  Negative for chills and fever.   Respiratory:  Negative for shortness of breath.    Cardiovascular:  Negative for chest pain.   Gastrointestinal:  Positive for nausea. Negative for constipation, diarrhea and vomiting.         Physical Exam     Initial Vitals [11/28/23 1243]   BP Pulse Resp Temp SpO2   127/83 86 16 98.2 °F (36.8 °C) 100 %      MAP       --     "     Physical Exam  Vitals and nursing note reviewed.   Constitutional:       General: She is not in acute distress.     Appearance: Normal appearance. She is not ill-appearing.   HENT:      Head: Normocephalic and atraumatic.      Mouth/Throat:      Mouth: Mucous membranes are moist.      Pharynx: Oropharynx is clear.   Eyes:      General: No scleral icterus.     Extraocular Movements: Extraocular movements intact.      Pupils: Pupils are equal, round, and reactive to light.   Cardiovascular:      Rate and Rhythm: Normal rate and regular rhythm.      Pulses: Normal pulses.      Heart sounds: Normal heart sounds.   Pulmonary:      Effort: Pulmonary effort is normal.      Breath sounds: Normal breath sounds. No stridor. No wheezing.   Abdominal:      General: Abdomen is flat. Bowel sounds are normal. There is no distension.      Palpations: Abdomen is soft.      Tenderness: There is abdominal tenderness in the right lower quadrant, epigastric area and periumbilical area. There is no guarding.   Musculoskeletal:         General: Normal range of motion.      Cervical back: Normal range of motion and neck supple.   Skin:     General: Skin is warm.      Coloration: Skin is not jaundiced.   Neurological:      General: No focal deficit present.      Mental Status: She is alert and oriented to person, place, and time.      Sensory: Sensory deficit (decreased sensation to right anterior and lateral thigh in L2,L3 dermatome) present.           ED Course   Procedures  Labs Reviewed   COMPREHENSIVE METABOLIC PANEL - Abnormal; Notable for the following components:       Result Value    Globulin 3.9 (*)     Albumin/Globulin Ratio 1.0 (*)     All other components within normal limits   URINALYSIS, REFLEX TO URINE CULTURE - Abnormal; Notable for the following components:    Bacteria, UA Trace (*)     Squamous Epithelial Cells, UA Few (*)     All other components within normal limits   CBC WITH DIFFERENTIAL - Abnormal; Notable for  the following components:    MCH 26.1 (*)     MCHC 32.3 (*)     MPV 10.8 (*)     All other components within normal limits   MANUAL DIFFERENTIAL - Abnormal; Notable for the following components:    Lymphs % 41 (*)     All other components within normal limits   LIPASE - Normal   CBC W/ AUTO DIFFERENTIAL    Narrative:     The following orders were created for panel order CBC W/ AUTO DIFFERENTIAL.  Procedure                               Abnormality         Status                     ---------                               -----------         ------                     CBC with Differential[[id number]]       Abnormal            Final result               Manual Differential[[id number]]         Abnormal            Final result                 Please view results for these tests on the individual orders.   EXTRA TUBES    Narrative:     The following orders were created for panel order EXTRA TUBES.  Procedure                               Abnormality         Status                     ---------                               -----------         ------                     Light Blue Top Hold[[id number]]                             In process                 Light Green Top Hold[[id number]]                            In process                 Gold Top Hold[[id number]]                                   In process                   Please view results for these tests on the individual orders.   LIGHT BLUE TOP HOLD   LIGHT GREEN TOP HOLD   GOLD TOP HOLD   POCT URINE PREGNANCY          Imaging Results    None          Medications - No data to display  Medical Decision Making  Amount and/or Complexity of Data Reviewed  External Data Reviewed: labs and notes.  Labs: ordered. Decision-making details documented in ED Course.    Risk  Prescription drug management.  Risk Details: Discharge with Sucralfate 4 times a day for 10 days and pantoprazole twice a day for 14 days then transition to once a day  Take precautions for acid  reflux such as not eating foods that exacerbate acid reflux do not eat at least 2 hours before bedtime      Additional MDM:   Differential Diagnosis:   Symptom: Abdominal pain. <> The follow diagnoses were considered and will be evaluated: Gastric Ulcer, Gastroesophageal Reflux and Pancreatitis.             Attending Attestation:   Physician Attestation Statement for Resident:  As the supervising MD   Physician Attestation Statement: I have personally seen and examined this patient.   I agree with the above history.  -:   As the supervising MD I agree with the above PE.     As the supervising MD I agree with the above treatment, course, plan, and disposition.    I have reviewed and agree with the residents interpretation of the following: lab data.                                        Clinical Impression:  Final diagnoses:  [R10.9] Abdominal pain, unspecified abdominal location (Primary)  [K52.9] Gastroenteritis          ED Disposition Condition    Discharge Stable          ED Prescriptions       Medication Sig Dispense Start Date End Date Auth. Provider    sucralfate (CARAFATE) 1 gram tablet Take 1 tablet (1 g total) by mouth 4 (four) times daily. for 10 days 40 tablet 11/28/2023 12/8/2023 Garrett Hernández MD    pantoprazole (PROTONIX) 40 MG tablet Take 1 tablet (40 mg total) by mouth 2 (two) times daily for 14 days, THEN 1 tablet (40 mg total) once daily for 14 days. 42 tablet 11/28/2023 12/26/2023 Garrett Hernández MD          Follow-up Information       Follow up With Specialties Details Why Contact Info    Mohini Sibley, JOCELYN [facility] Call   [address] [facility] [address]  [phone]      [facility] Emergency Medicine  As needed, If symptoms worsen [address] [facility] [address] [phone]             Garrett Hernández MD  Resident  11/28/23 9071       Abe Marlow MD  12/01/23 1248

## 2023-12-05 ENCOUNTER — ON-DEMAND VIRTUAL (OUTPATIENT)
Dept: URGENT CARE | Facility: CLINIC | Age: 28
End: 2023-12-05
Payer: MEDICAID

## 2023-12-05 DIAGNOSIS — K62.5 RECTAL BLEEDING: ICD-10-CM

## 2023-12-05 DIAGNOSIS — R10.9 ABDOMINAL PAIN, UNSPECIFIED ABDOMINAL LOCATION: Primary | ICD-10-CM

## 2023-12-05 PROCEDURE — 99202 OFFICE O/P NEW SF 15 MIN: CPT | Mod: 95,,, | Performed by: NURSE PRACTITIONER

## 2023-12-05 PROCEDURE — 99202 PR OFFICE/OUTPT VISIT, NEW, LEVL II, 15-29 MIN: ICD-10-PCS | Mod: 95,,, | Performed by: NURSE PRACTITIONER

## 2023-12-05 NOTE — LETTER
December 5, 2023    Janet Frank  [address] [address]             Virtual Visit - Urgent Care  Urgent Care  [address] [facility] [address] [facility] [address]   December 5, 2023     Patient: Janet Frank   YOB: 1995   Date of Visit: 12/5/2023       To Whom it May Concern:    Janet Frank was seen virtually on 12/5/2023 for illness and symptoms requiring further follow-up. She may return with limitations of not lifting more than 10lb until cleared by Primary Care .    Please excuse her from any classes or work missed.    If you have any questions or concerns, please don't hesitate to call.    Sincerely,         Maile Preciado, NP

## 2023-12-05 NOTE — PROGRESS NOTES
Subjective:      Patient ID: Janet Frank is a 28 y.o. female.    Vitals:  vitals were not taken for this visit.     Chief Complaint: GI Problem and Rectal Bleeding      Visit Type: TELE AUDIOVISUAL    Present with the patient at the time of consultation: TELEMED PRESENT WITH PATIENT: None    Past Medical History:   Diagnosis Date    GERD (gastroesophageal reflux disease)      History reviewed. No pertinent surgical history.  Review of patient's allergies indicates:   Allergen Reactions    Benadryl allergy decongestant Hives     Current Outpatient Medications on File Prior to Visit   Medication Sig Dispense Refill    pantoprazole (PROTONIX) 40 MG tablet Take 1 tablet (40 mg total) by mouth once daily. 90 tablet 1    pantoprazole (PROTONIX) 40 MG tablet Take 1 tablet (40 mg total) by mouth 2 (two) times daily for 14 days, THEN 1 tablet (40 mg total) once daily for 14 days. 42 tablet 0    sucralfate (CARAFATE) 1 gram tablet Take 1 tablet (1 g total) by mouth 4 (four) times daily. for 10 days 40 tablet 0    sumatriptan (IMITREX) 25 MG Tab Take 1 tablet (25 mg total) by mouth every 6 (six) hours as needed (headache). 60 tablet 2     No current facility-administered medications on file prior to visit.     Family History   Problem Relation Age of Onset    Breast cancer Mother     No Known Problems Father     Cervical cancer Maternal Grandmother            Ohs Peq Odvv Intake    12/5/2023 11:13 AM CST - Filed by Patient   Describe your reason for todays visit My stomach feel is burning and my butt still have blood coming out   What is your current physical address in the event of a medical emergency? [address]   Are you able to take your vital signs? No   Please attach any relevant images or files          Seen in ED 11/28/23 for the same. Abdominal pain and rectal bleeding persist. Prescribed meds but unable to get d/t insurance. Tolerating PO intake. Pain worsens with food. Taking Prilosec OTC with no relief. Has  f/u next month with PCP. Needs a work excuse.    GI Problem  The primary symptoms include abdominal pain and hematochezia. Primary symptoms do not include fever, nausea or vomiting.   The illness does not include chills.   Rectal Bleeding  Associated symptoms include abdominal pain. Pertinent negatives include no chills, fever, nausea or vomiting.       Constitution: Negative for appetite change, chills and fever.   Gastrointestinal:  Positive for abdominal pain, bright red blood in stool, rectal bleeding, rectal pain and heartburn. Negative for nausea and vomiting.        Objective:   The physical exam was conducted virtually.  Physical Exam   Constitutional: She is oriented to person, place, and time. She does not appear ill. No distress.   HENT:   Head: Normocephalic and atraumatic.   Nose: Nose normal.   Eyes: Extraocular movement intact   Pulmonary/Chest: Effort normal.   Abdominal: Normal appearance.   Musculoskeletal: Normal range of motion.         General: Normal range of motion.   Neurological: no focal deficit. She is alert and oriented to person, place, and time.   Psychiatric: Her behavior is normal. Mood normal.   Vitals reviewed.      Assessment:     1. Abdominal pain, unspecified abdominal location    2. Rectal bleeding        Plan:   Patient encouraged to monitor symptoms closely and instructed to follow-up for symptoms. Further, in-person, evaluation is necessary for continued treatment. Please follow up with your primary care doctor or specialist as needed. Verbally discussed plan. Patient confirms understanding and is in agreement with treatment and plan.     You must understand that you've received a Virtual Care evaluation only and that you may be released before all your medical problems are known or treated. You, the patient, will arrange for follow up care as instructed.      Abdominal pain, unspecified abdominal location    Rectal bleeding             Patient Instructions   Severe Abdominal  Pain Discharge Instructions, Adult   About this topic   Abdominal or belly pain is pain between your chest and hips. Sometimes, it is a sign of a problem that is not very serious. Viruses or germs are a common cause. Overeating, gas pains, and food poisoning can all cause belly pain. Stools that are too loose or too hard can also cause belly pain. Sometimes, belly pain is a sign of a very bad health problem, like bleeding or an infection. How bad the pain is does not reflect how bad the problem may be. Some serious problems can cause very little pain.     What care is needed at home?   Ask your doctor what you need to do when you go home. Make sure you ask questions if you do not understand what the doctor says.  Keep a diary about your pain to help your doctor learn more about the cause. Write down the foods you eat to see if they may be the cause of your pain. Also, write down what you were doing before and during the pain.  Eat small meals more often. Eat more fiber if hard stools are a problem.  Avoid foods or drinks that make your pain worse. Some people are bothered by:  Drinks that are fizzy or have caffeine.  Fried, greasy, or fatty foods.  Orange juice.  Milk or cheese can bother some peoples stomach as well.  When you have pain, you can:  Try to have a bowel movement.  Lie down and rest.  Avoid solid foods for a few hours. If you are hungry, try liquids like broth or water. When you feel better, try mild foods like rice, crackers, bananas, applesauce, or toast.  Dont take over-the-counter medicines, such as antacids or laxatives, unless they are ordered.  Check with the doctor before you take any herbal medicines or supplements.     What follow-up care is needed?   Your doctor may ask you to make visits to the office to check on your progress. Be sure to keep these visits.   What drugs may be needed?   The drugs that your doctor will give depend on what causes the pain. The doctor may order drugs  to:  Help with pain and swelling  Lower muscle spasms or contractions  Lower acid levels in your belly  Relax bowel muscles  Fight an infection  Control hormones  Lower stress or anxiety  Take your drugs as directed by your doctor. Talk to you doctor about side effects of the drugs.  What problems could happen?   Trouble dealing with ongoing pain  Less appetite  Not able to do regular activities  When do I need to call the doctor?   You have sudden severe belly pain, or the pain is constant.  You have trouble breathing or chest pain along with your belly pain.  You start throwing up blood or pass a lot of blood in your stool.  Your belly becomes very hard or swollen.  You have signs of severe fluid loss, such as:  No urine for more than 8 hours.  You feel very light-headed or like you are going to pass out.  You feel weak, like you are going to fall.  Your pain gets worse, comes more often, or moves to one area of the belly.  You have an upset stomach or throwing up that isnt getting better and are having trouble keeping down food and drink.  Your stools are black or tar-colored.  If the pain is not gone or not getting better in 1 to 2 days.  You have a fever of 100.4°F (38°C) or higher, chills.  You develop early signs of fluid loss, such as:  Your urine is very dark-colored.  Your mouth is dry.  You have muscle cramps.  You have a lack of energy.  You feel light-headed when you get up.  You have pain with passing urine or have blood in your urine.  Your stools have a small amount (less than 1 teaspoon or 5 mL) of blood in them.  You feel that something is not right in your belly.  Teach Back: Helping You Understand   The Teach Back Method helps you understand the information we are giving you. After you talk with the staff, tell them in your own words what you learned. This helps to make sure the staff has described each thing clearly. It also helps to explain things that may have been confusing. Before going  home, make sure you can do these:  I can tell you about my pain.  I can tell you what may help ease my pain.  I can tell you what I will do if I have very bad back, side, chest, or belly pain; more pain; or the pain comes more often.  Where can I learn more?   [employer]  https://www.iffgd.org/lower-gi-disorders/functional-abdominal-pain-syndrome.html   [facility]  https://www.nhs.uk/conditions/stomach-ache/   Last Reviewed Date   2021-06-04  Consumer Information Use and Disclaimer   This information is not specific medical advice and does not replace information you receive from your health care provider. This is only a brief summary of general information. It does NOT include all information about conditions, illnesses, injuries, tests, procedures, treatments, therapies, discharge instructions or life-style choices that may apply to you. You must talk with your health care provider for complete information about your health and treatment options. This information should not be used to decide whether or not to accept your health care providers advice, instructions or recommendations. Only your health care provider has the knowledge and training to provide advice that is right for you.  Copyright   Copyright © 2021 [employer] and its affiliates and/or licensors. All rights reserved.  Bloody Stools Discharge Instructions, Adult   About this topic   Blood in the stool is often a sign of a problem in the bowel. Many things can cause blood in the stool. Some are serious things like bleeding from the intestine, cancer, or a serious bacterial infection. Most often it is not serious. It may come from a swollen blood vessel around where the stool comes out. This is called a hemorrhoid. It may also come from small rips in the skin near where the stool comes out. This is an anal fissure. Blood may be seen as a maroon color, bright red, or a black tar.  Sometimes the doctors cannot  find a specific cause. It may be a sign of some other illness. The doctor can test the stool to try to find out the reason for the blood.  Bleeding in the stool is more concerning when it occurs often, becomes constant, or comes with other symptoms like fever.  Treatment will depend on the cause of the bleeding.     What care is needed at home?   Ask your doctor what you need to do when you go home. Make sure you ask questions if you do not understand what the doctor says. This way you will know what you need to do.  Take all your medicines as ordered.  If ordered, sit in a warm water tub deep enough to cover the hips and buttocks for 15 minutes. Do this 2 to 3 times each day to help with itching or pain.  You may have to save your stool and take a sample to the lab.  Try to avoid straining when you have a bowel movement.  Doing the following can help you avoid constipation:  Eat high fiber foods. These include whole grains, fruits, and vegetables.  Drink plenty of water and other fluids each day.  Get regular exercise.  Sit in a warm bath a few times a day for about 15 minutes each time. This can help you relax and feel like you can have a bowel movement.  Keep a written diary of how often you have bloody stools.  What follow-up care is needed?   Your doctor may ask you to make visits to the office to check on your progress. Be sure to keep these visits.  Your doctor may order lab tests or other tests to determine what problems you are having and how well you are healing. Be sure to keep these visits.  What drugs may be needed?   The doctor may order drugs to:  Help relieve pain, itching, and swelling  Soften stools and reduce straining  Treat constipation  Help with stomach cramps  Decrease stomach acid  Will physical activity be limited?   Exercise may help with digestion and could prevent hard stools. Ask your doctor about the right amount of activity for you.  What changes to diet are needed?   Drink lots of  fluids. Drink at least 8 to 10 glasses every day.  Eat high fiber foods like fruits and vegetables. This will help lessen the strain during bowel movement.  If they bother you, avoid eating acidic foods like spices, citrus fruits, tomatoes, onion, garlic, and cranberries.  Avoid caffeinated and carbonated drinks. These may bother the stomach.  Do not drink beer, wine, and mixed drinks (alcohol).  What problems could happen?   Stomach pain  Anal pain, itching, and swelling  Pain during bowel movements  Hard stools if holding stools due to pain  Need to have more tests if you do not get better  Surgery to fix a problem that will not go away  When do I need to call the doctor?   Fever of 100.4°F (38°C) or higher  Very bad belly pain  Very bad headache with bloody stool  Bleeding gets worse  Weight loss  Very bad loose stools  Throwing up over and over  You feel like you may pass out  Helpful tips   Vitamins and supplements with iron will cause you to have dark-colored stools.  Blue, red, and purple foods like blueberries, beets, and blackberries may cause a bloody-like stool.  Teach Back: Helping You Understand   The Teach Back Method helps you understand the information we are giving you. After you talk with the staff, tell them in your own words what you learned. This helps to make sure the staff has described each thing clearly. It also helps to explain things that may have been confusing. Before going home, make sure you can do these:  I can tell you about my condition.  I can tell you what changes I need to make with my diet or drugs.  I can tell you what I will do if I have very bad loose stools or more bleeding.  Last Reviewed Date   2021-06-04  Consumer Information Use and Disclaimer   This information is not specific medical advice and does not replace information you receive from your health care provider. This is only a brief summary of general information. It does NOT include all information about  conditions, illnesses, injuries, tests, procedures, treatments, therapies, discharge instructions or life-style choices that may apply to you. You must talk with your health care provider for complete information about your health and treatment options. This information should not be used to decide whether or not to accept your health care providers advice, instructions or recommendations. Only your health care provider has the knowledge and training to provide advice that is right for you.  Copyright   Copyright © 2021 [employer] and its affiliates and/or licensors. All rights reserved.

## 2023-12-05 NOTE — PATIENT INSTRUCTIONS
Severe Abdominal Pain Discharge Instructions, Adult   About this topic   Abdominal or belly pain is pain between your chest and hips. Sometimes, it is a sign of a problem that is not very serious. Viruses or germs are a common cause. Overeating, gas pains, and food poisoning can all cause belly pain. Stools that are too loose or too hard can also cause belly pain. Sometimes, belly pain is a sign of a very bad health problem, like bleeding or an infection. How bad the pain is does not reflect how bad the problem may be. Some serious problems can cause very little pain.     What care is needed at home?   Ask your doctor what you need to do when you go home. Make sure you ask questions if you do not understand what the doctor says.  Keep a diary about your pain to help your doctor learn more about the cause. Write down the foods you eat to see if they may be the cause of your pain. Also, write down what you were doing before and during the pain.  Eat small meals more often. Eat more fiber if hard stools are a problem.  Avoid foods or drinks that make your pain worse. Some people are bothered by:  Drinks that are fizzy or have caffeine.  Fried, greasy, or fatty foods.  Orange juice.  Milk or cheese can bother some peoples stomach as well.  When you have pain, you can:  Try to have a bowel movement.  Lie down and rest.  Avoid solid foods for a few hours. If you are hungry, try liquids like broth or water. When you feel better, try mild foods like rice, crackers, bananas, applesauce, or toast.  Dont take over-the-counter medicines, such as antacids or laxatives, unless they are ordered.  Check with the doctor before you take any herbal medicines or supplements.     What follow-up care is needed?   Your doctor may ask you to make visits to the office to check on your progress. Be sure to keep these visits.   What drugs may be needed?   The drugs that your doctor will give depend on what causes the pain. The doctor may  order drugs to:  Help with pain and swelling  Lower muscle spasms or contractions  Lower acid levels in your belly  Relax bowel muscles  Fight an infection  Control hormones  Lower stress or anxiety  Take your drugs as directed by your doctor. Talk to you doctor about side effects of the drugs.  What problems could happen?   Trouble dealing with ongoing pain  Less appetite  Not able to do regular activities  When do I need to call the doctor?   You have sudden severe belly pain, or the pain is constant.  You have trouble breathing or chest pain along with your belly pain.  You start throwing up blood or pass a lot of blood in your stool.  Your belly becomes very hard or swollen.  You have signs of severe fluid loss, such as:  No urine for more than 8 hours.  You feel very light-headed or like you are going to pass out.  You feel weak, like you are going to fall.  Your pain gets worse, comes more often, or moves to one area of the belly.  You have an upset stomach or throwing up that isnt getting better and are having trouble keeping down food and drink.  Your stools are black or tar-colored.  If the pain is not gone or not getting better in 1 to 2 days.  You have a fever of 100.4°F (38°C) or higher, chills.  You develop early signs of fluid loss, such as:  Your urine is very dark-colored.  Your mouth is dry.  You have muscle cramps.  You have a lack of energy.  You feel light-headed when you get up.  You have pain with passing urine or have blood in your urine.  Your stools have a small amount (less than 1 teaspoon or 5 mL) of blood in them.  You feel that something is not right in your belly.  Teach Back: Helping You Understand   The Teach Back Method helps you understand the information we are giving you. After you talk with the staff, tell them in your own words what you learned. This helps to make sure the staff has described each thing clearly. It also helps to explain things that may have been confusing.  Before going home, make sure you can do these:  I can tell you about my pain.  I can tell you what may help ease my pain.  I can tell you what I will do if I have very bad back, side, chest, or belly pain; more pain; or the pain comes more often.  Where can I learn more?   [employer]  https://www.iffgd.org/lower-gi-disorders/functional-abdominal-pain-syndrome.html   [facility]  https://www.nhs.uk/conditions/stomach-ache/   Last Reviewed Date   2021-06-04  Consumer Information Use and Disclaimer   This information is not specific medical advice and does not replace information you receive from your health care provider. This is only a brief summary of general information. It does NOT include all information about conditions, illnesses, injuries, tests, procedures, treatments, therapies, discharge instructions or life-style choices that may apply to you. You must talk with your health care provider for complete information about your health and treatment options. This information should not be used to decide whether or not to accept your health care providers advice, instructions or recommendations. Only your health care provider has the knowledge and training to provide advice that is right for you.  Copyright   Copyright © 2021 [employer] and its affiliates and/or licensors. All rights reserved.  Bloody Stools Discharge Instructions, Adult   About this topic   Blood in the stool is often a sign of a problem in the bowel. Many things can cause blood in the stool. Some are serious things like bleeding from the intestine, cancer, or a serious bacterial infection. Most often it is not serious. It may come from a swollen blood vessel around where the stool comes out. This is called a hemorrhoid. It may also come from small rips in the skin near where the stool comes out. This is an anal fissure. Blood may be seen as a maroon color, bright red, or a black tar.  Sometimes the  doctors cannot find a specific cause. It may be a sign of some other illness. The doctor can test the stool to try to find out the reason for the blood.  Bleeding in the stool is more concerning when it occurs often, becomes constant, or comes with other symptoms like fever.  Treatment will depend on the cause of the bleeding.     What care is needed at home?   Ask your doctor what you need to do when you go home. Make sure you ask questions if you do not understand what the doctor says. This way you will know what you need to do.  Take all your medicines as ordered.  If ordered, sit in a warm water tub deep enough to cover the hips and buttocks for 15 minutes. Do this 2 to 3 times each day to help with itching or pain.  You may have to save your stool and take a sample to the lab.  Try to avoid straining when you have a bowel movement.  Doing the following can help you avoid constipation:  Eat high fiber foods. These include whole grains, fruits, and vegetables.  Drink plenty of water and other fluids each day.  Get regular exercise.  Sit in a warm bath a few times a day for about 15 minutes each time. This can help you relax and feel like you can have a bowel movement.  Keep a written diary of how often you have bloody stools.  What follow-up care is needed?   Your doctor may ask you to make visits to the office to check on your progress. Be sure to keep these visits.  Your doctor may order lab tests or other tests to determine what problems you are having and how well you are healing. Be sure to keep these visits.  What drugs may be needed?   The doctor may order drugs to:  Help relieve pain, itching, and swelling  Soften stools and reduce straining  Treat constipation  Help with stomach cramps  Decrease stomach acid  Will physical activity be limited?   Exercise may help with digestion and could prevent hard stools. Ask your doctor about the right amount of activity for you.  What changes to diet are needed?    Drink lots of fluids. Drink at least 8 to 10 glasses every day.  Eat high fiber foods like fruits and vegetables. This will help lessen the strain during bowel movement.  If they bother you, avoid eating acidic foods like spices, citrus fruits, tomatoes, onion, garlic, and cranberries.  Avoid caffeinated and carbonated drinks. These may bother the stomach.  Do not drink beer, wine, and mixed drinks (alcohol).  What problems could happen?   Stomach pain  Anal pain, itching, and swelling  Pain during bowel movements  Hard stools if holding stools due to pain  Need to have more tests if you do not get better  Surgery to fix a problem that will not go away  When do I need to call the doctor?   Fever of 100.4°F (38°C) or higher  Very bad belly pain  Very bad headache with bloody stool  Bleeding gets worse  Weight loss  Very bad loose stools  Throwing up over and over  You feel like you may pass out  Helpful tips   Vitamins and supplements with iron will cause you to have dark-colored stools.  Blue, red, and purple foods like blueberries, beets, and blackberries may cause a bloody-like stool.  Teach Back: Helping You Understand   The Teach Back Method helps you understand the information we are giving you. After you talk with the staff, tell them in your own words what you learned. This helps to make sure the staff has described each thing clearly. It also helps to explain things that may have been confusing. Before going home, make sure you can do these:  I can tell you about my condition.  I can tell you what changes I need to make with my diet or drugs.  I can tell you what I will do if I have very bad loose stools or more bleeding.  Last Reviewed Date   2021-06-04  Consumer Information Use and Disclaimer   This information is not specific medical advice and does not replace information you receive from your health care provider. This is only a brief summary of general information. It does NOT include all information  about conditions, illnesses, injuries, tests, procedures, treatments, therapies, discharge instructions or life-style choices that may apply to you. You must talk with your health care provider for complete information about your health and treatment options. This information should not be used to decide whether or not to accept your health care providers advice, instructions or recommendations. Only your health care provider has the knowledge and training to provide advice that is right for you.  Copyright   Copyright © 2021 [employer] and its affiliates and/or licensors. All rights reserved.

## 2024-01-04 ENCOUNTER — TELEPHONE (OUTPATIENT)
Dept: INTERNAL MEDICINE | Facility: CLINIC | Age: 29
End: 2024-01-04
Payer: MEDICAID

## 2024-01-04 NOTE — TELEPHONE ENCOUNTER
Spoke to pt. Stated she dropped off paperwork at the  and will discuss at her rescheduled appt. On 1/11/24.

## 2024-01-04 NOTE — TELEPHONE ENCOUNTER
----- Message from Fely Knapp sent at 1/4/2024  7:50 AM CST -----  Regarding: Patient Call  .Type:  Patient Returning Call    Who Called:pt  Who Left Message for Patient:office staff  Does the patient know what this is regarding?:missed appointment  Would the patient rather a call back or a response via [facility]?   Best Call Back Number:[phone]  Additional Information: pt phoned stating she missed her appointment for 7am on  01/04 and wanted to come explain to patient that wasn't any available appointment for 01/04, tried to r/s for next available which is for 01/10 she  stated she had some paperwork that needs to be signed, please call patient

## 2024-02-08 ENCOUNTER — OFFICE VISIT (OUTPATIENT)
Dept: INTERNAL MEDICINE | Facility: CLINIC | Age: 29
End: 2024-02-08
Payer: COMMERCIAL

## 2024-02-08 VITALS
TEMPERATURE: 99 F | SYSTOLIC BLOOD PRESSURE: 126 MMHG | HEART RATE: 93 BPM | RESPIRATION RATE: 18 BRPM | WEIGHT: 242.38 LBS | BODY MASS INDEX: 42.95 KG/M2 | HEIGHT: 63 IN | DIASTOLIC BLOOD PRESSURE: 81 MMHG

## 2024-02-08 DIAGNOSIS — K92.1 HEMATOCHEZIA: ICD-10-CM

## 2024-02-08 DIAGNOSIS — R10.84 GENERALIZED ABDOMINAL PAIN: Primary | ICD-10-CM

## 2024-02-08 DIAGNOSIS — E66.01 CLASS 3 SEVERE OBESITY DUE TO EXCESS CALORIES WITH SERIOUS COMORBIDITY AND BODY MASS INDEX (BMI) OF 40.0 TO 44.9 IN ADULT: Chronic | ICD-10-CM

## 2024-02-08 DIAGNOSIS — K21.9 GASTROESOPHAGEAL REFLUX DISEASE, UNSPECIFIED WHETHER ESOPHAGITIS PRESENT: Chronic | ICD-10-CM

## 2024-02-08 PROCEDURE — 3079F DIAST BP 80-89 MM HG: CPT | Mod: CPTII,,, | Performed by: NURSE PRACTITIONER

## 2024-02-08 PROCEDURE — 3008F BODY MASS INDEX DOCD: CPT | Mod: CPTII,,, | Performed by: NURSE PRACTITIONER

## 2024-02-08 PROCEDURE — 1159F MED LIST DOCD IN RCRD: CPT | Mod: CPTII,,, | Performed by: NURSE PRACTITIONER

## 2024-02-08 PROCEDURE — 3074F SYST BP LT 130 MM HG: CPT | Mod: CPTII,,, | Performed by: NURSE PRACTITIONER

## 2024-02-08 PROCEDURE — 1160F RVW MEDS BY RX/DR IN RCRD: CPT | Mod: CPTII,,, | Performed by: NURSE PRACTITIONER

## 2024-02-08 PROCEDURE — 99214 OFFICE O/P EST MOD 30 MIN: CPT | Mod: PBBFAC | Performed by: NURSE PRACTITIONER

## 2024-02-08 PROCEDURE — 99214 OFFICE O/P EST MOD 30 MIN: CPT | Mod: S$PBB,,, | Performed by: NURSE PRACTITIONER

## 2024-02-08 RX ORDER — FAMOTIDINE 40 MG/1
40 TABLET, FILM COATED ORAL NIGHTLY
Qty: 30 TABLET | Refills: 6 | Status: SHIPPED | OUTPATIENT
Start: 2024-02-08

## 2024-02-08 NOTE — PROGRESS NOTES
Instructions given on how to collect stool for FIT test and H Pylori . Stressed the importance of dating and timing it. She voiced understanding.

## 2024-02-08 NOTE — PROGRESS NOTES
Patient ID: [id number]     Chief Complaint: Abdominal Pain (States stomach be burning  wakes her up at night.)    HPI:     Janet Frank is a 28 y.o. female with diagnosis of chronic headaches, obesity. Patient seen in clinic today for follow up. Patient last seen in clinic on 2023.  Today, patient states generalized abdominal pain, worse at night. Patient was prescribed Pantoprazole but insurance is not covering cost of medication and patient cannot afford cash price. Patient denies N/V/D/C, weight loss. States she has noticed some blood in stool at times, denies hemorrhoids.   Patient denies any other acute complaints.     Review of patient's allergies indicates:   Allergen Reactions    Benadryl allergy decongestant Hives     Breast Cancer Screening: deferred due to age  Cervical Cancer Screenin2022  Colorectal Cancer Screening: deferred due to age  Diabetic Eye Exam: N/A  Diabetic Foot Exam: N/A  Lung Cancer Screening: N/A  Prostate Cancer Screening: N/A  AAA Screening: N/A  Osteoporosis Screening: deferred due to age  Medicare Wellness: N/A  Immunizations:   Immunization History   Administered Date(s) Administered    COVID-19, MRNA, LN-S, PF (Pfizer) (Purple Cap) 2021, 2021     History reviewed. No pertinent surgical history.    family history includes Breast cancer in her mother; Cervical cancer in her maternal grandmother; No Known Problems in her father.    Social History     Socioeconomic History    Marital status: Significant Other   Tobacco Use    Smoking status: Former     Types: Vaping w/o nicotine     Start date: 2022     Quit date: 2023     Years since quittin.1    Smokeless tobacco: Never   Substance and Sexual Activity    Alcohol use: Yes     Comment: occassionally    Drug use: Never    Sexual activity: Yes     Partners: Male     Social Determinants of Health     Financial Resource Strain: High Risk (2024)    Overall Financial Resource Strain (CARDIA)      Difficulty of Paying Living Expenses: Very hard   Food Insecurity: Food Insecurity Present (1/4/2024)    Hunger Vital Sign     Worried About Running Out of Food in the Last Year: Often true     Ran Out of Food in the Last Year: Often true   Transportation Needs: Unmet Transportation Needs (1/4/2024)    PRAPARE - Transportation     Lack of Transportation (Medical): Yes     Lack of Transportation (Non-Medical): Yes   Physical Activity: Inactive (1/4/2024)    Exercise Vital Sign     Days of Exercise per Week: 0 days     Minutes of Exercise per Session: 0 min   Stress: No Stress Concern Present (1/4/2024)    [employer] [place] [employer] - Occupational Stress Questionnaire     Feeling of Stress : Not at all   Social Connections: Unknown (1/4/2024)    Social Connection and Isolation Panel [NHANES]     Frequency of Communication with Friends and Family: Patient declined     Frequency of Social Gatherings with Friends and Family: Twice a week     Active Member of Clubs or Organizations: No     Attends Club or Organization Meetings: Patient declined     Marital Status: Never    Housing Stability: High Risk (1/4/2024)    Housing Stability Vital Sign     Unable to Pay for Housing in the Last Year: Yes     Number of Places Lived in the Last Year: 1     Unstable Housing in the Last Year: No     Current Outpatient Medications   Medication Instructions    famotidine (PEPCID) 40 mg, Oral, Nightly    sumatriptan (IMITREX) 25 mg, Oral, Every 6 hours PRN       Subjective:     Review of Systems   Constitutional: Negative.    HENT: Negative.     Eyes: Negative.    Respiratory: Negative.     Cardiovascular: Negative.    Gastrointestinal:  Positive for abdominal pain and blood in stool.   Endocrine: Negative.    Genitourinary: Negative.    Musculoskeletal: Negative.    Skin: Negative.    Allergic/Immunologic: Negative.    Neurological: Negative.    Hematological: Negative.    Psychiatric/Behavioral: Negative.    "      Objective:     Visit Vitals  /81 (BP Location: Left arm, Patient Position: Sitting, BP Method: Large (Automatic))   Pulse 93   Temp 99.4 °F (37.4 °C) (Oral)   Resp 18   Ht 5' 2.99" (1.6 m)   Wt 110 kg (242 lb 6.4 oz)   LMP 01/09/2024   BMI 42.95 kg/m²       Physical Exam  Vitals reviewed.   Constitutional:       Appearance: Normal appearance. She is obese.   HENT:      Head: Normocephalic and atraumatic.      Mouth/Throat:      Mouth: Mucous membranes are moist.      Pharynx: Oropharynx is clear.   Eyes:      Extraocular Movements: Extraocular movements intact.      Conjunctiva/sclera: Conjunctivae normal.      Pupils: Pupils are equal, round, and reactive to light.   Cardiovascular:      Rate and Rhythm: Normal rate and regular rhythm.      Heart sounds: Normal heart sounds.   Pulmonary:      Effort: Pulmonary effort is normal.      Breath sounds: Normal breath sounds.   Abdominal:      General: Bowel sounds are normal.   Genitourinary:     Comments: Rectum assessed    Shari Rossi LPN present in room for chaperone   Musculoskeletal:         General: Normal range of motion.      Cervical back: Normal range of motion.   Skin:     General: Skin is warm and dry.   Neurological:      Mental Status: She is alert and oriented to person, place, and time.   Psychiatric:         Mood and Affect: Mood normal.         Behavior: Behavior normal.       Labs Reviewed:     Hematology:  Lab Results   Component Value Date    WBC 9.41 11/28/2023    HGB 12.5 11/28/2023    HCT 38.7 11/28/2023     11/28/2023     Chemistry:  Lab Results   Component Value Date     11/28/2023    K 4.1 11/28/2023    CHLORIDE 106 11/28/2023    BUN 10.4 11/28/2023    CREATININE 0.79 11/28/2023    EGFRNORACEVR >60 11/28/2023    GLUCOSE 89 11/28/2023    CALCIUM 9.0 11/28/2023    ALKPHOS 61 11/28/2023    LABPROT 7.8 11/28/2023    ALBUMIN 3.9 11/28/2023    BILIDIR 0.1 10/05/2020    IBILI 0.10 10/05/2020    AST 17 11/28/2023    ALT 16 " 11/28/2023    KVYNBHLY90NN 9.7 (L) 04/24/2023     Lab Results   Component Value Date    HGBA1C 5.8 04/24/2023     Lipid Panel:  Lab Results   Component Value Date    CHOL 182 04/24/2023    HDL 33 (L) 04/24/2023    .00 04/24/2023    TRIG 128 04/24/2023    TOTALCHOLEST 6 (H) 04/24/2023     Thyroid:  Lab Results   Component Value Date    TSH 1.680 04/24/2023     Urine:  Lab Results   Component Value Date    COLORUA Light-Yellow 11/28/2023    APPEARANCEUA Clear 11/28/2023    SGUA 1.017 11/28/2023    PHUA 7.5 11/28/2023    PROTEINUA Negative 11/28/2023    GLUCOSEUA Normal 11/28/2023    KETONESUA Negative 11/28/2023    BLOODUA Negative 11/28/2023    NITRITESUA Negative 11/28/2023    LEUKOCYTESUR Negative 11/28/2023    RBCUA 0-5 11/28/2023    WBCUA 0-5 11/28/2023    BACTERIA Trace (A) 11/28/2023    SQEPUA Few (A) 11/28/2023    HYALINECASTS None Seen 11/28/2023        Assessment:       ICD-10-CM ICD-9-CM   1. Generalized abdominal pain  R10.84 789.07   2. Hematochezia  K92.1 578.1   3. Gastroesophageal reflux disease, unspecified whether esophagitis present  K21.9 530.81   4. Class 3 severe obesity due to excess calories with serious comorbidity and body mass index (BMI) of 40.0 to 44.9 in adult  E66.01 278.01    Z68.41 V85.41        Plan:     1. Generalized abdominal pain  - Helicobacter Pylori Antigen Fecal EIA; Future  - Helicobacter Pylori Antigen Fecal EIA  - CT Abdomen Pelvis W Wo Contrast; Future    2. Hematochezia  - OCCULT BLOOD FECAL IMMUNOASSAY; Future  - OCCULT BLOOD FECAL IMMUNOASSAY    3. Gastroesophageal reflux disease, unspecified whether esophagitis present  Start Pepcid 40 mg Qhs prn  Avoid spicy foods  Remain in an upright position for at least 30 minutes after consuming meals    4. Class 3 severe obesity due to excess calories with serious comorbidity and body mass index (BMI) of 40.0 to 44.9 in adult  Body mass index is 42.95 kg/m².  TSH   Date Value Ref Range Status   04/24/2023 1.680 0.350 -  4.940 uIU/mL Final     Vit D 25 OH   Date Value Ref Range Status   04/24/2023 9.7 (L) 30.0 - 80.0 ng/mL Final     Hemoglobin A1c   Date Value Ref Range Status   04/24/2023 5.8 <=7.0 % Final   Sleep Study: none noted  Weight Loss Encouraged  Increase Physical Activity      Follow up in about 6 weeks (around 3/21/2024) for Virtual Visit, Stool Test, CT Abdomen. In addition to their scheduled follow up, the patient has also been instructed to follow up on as needed basis.     Mohini Sibley, YOLAP

## 2024-02-12 ENCOUNTER — PATIENT MESSAGE (OUTPATIENT)
Dept: INTERNAL MEDICINE | Facility: CLINIC | Age: 29
End: 2024-02-12
Payer: COMMERCIAL

## 2024-02-13 ENCOUNTER — ON-DEMAND VIRTUAL (OUTPATIENT)
Dept: URGENT CARE | Facility: CLINIC | Age: 29
End: 2024-02-13
Payer: COMMERCIAL

## 2024-02-13 DIAGNOSIS — R10.9 ABDOMINAL PAIN, UNSPECIFIED ABDOMINAL LOCATION: Primary | ICD-10-CM

## 2024-02-13 PROCEDURE — 99212 OFFICE O/P EST SF 10 MIN: CPT | Mod: 95,,, | Performed by: NURSE PRACTITIONER

## 2024-02-13 NOTE — LETTER
February 13, 2024    Janet Frank  [address] [address]             Virtual Visit - Urgent Care  Urgent Care  [address] [facility] [address] [facility] [address]   February 13, 2024     Patient: Janet Frank   YOB: 1995   Date of Visit: 2/13/2024       To Whom it May Concern:    Janet Frank was seen virtually on 2/13/2024 for illness and symptoms requiring missed time off from work. She may return to work on or after 2/15/24 provided symptoms have improved .    Please excuse her from any classes or work missed.    If you have any questions or concerns, please don't hesitate to call.    Sincerely,         Maile Preciado, NP

## 2024-02-13 NOTE — PROGRESS NOTES
"Subjective:      Patient ID: Janet Frank is a 28 y.o. female.    Vitals:  vitals were not taken for this visit.     Chief Complaint: Abdominal Pain      Visit Type: TELE AUDIOVISUAL    Present with the patient at the time of consultation: TELEMED PRESENT WITH PATIENT: None    Past Medical History:   Diagnosis Date    GERD (gastroesophageal reflux disease)      History reviewed. No pertinent surgical history.  Review of patient's allergies indicates:   Allergen Reactions    Benadryl allergy decongestant Hives     Current Outpatient Medications on File Prior to Visit   Medication Sig Dispense Refill    famotidine (PEPCID) 40 MG tablet Take 1 tablet (40 mg total) by mouth every evening. 30 tablet 6    sumatriptan (IMITREX) 25 MG Tab Take 1 tablet (25 mg total) by mouth every 6 (six) hours as needed (headache). (Patient not taking: Reported on 2/8/2024) 60 tablet 2     No current facility-administered medications on file prior to visit.     Family History   Problem Relation Age of Onset    Breast cancer Mother     No Known Problems Father     Cervical cancer Maternal Grandmother            Ohs Peq Odvv Intake    2/13/2024  8:03 AM CST - Filed by Patient   What is your current physical address in the event of a medical emergency? [address] [place]   Are you able to take your vital signs? No   Please attach any relevant images or files          Seen multiple times for the same. Abdominal pain intermittent for 2 months. "Burning and ripping pain all over" the stomach. Pain radiates to legs with numbness. No N/V/D. LBM this AM. +blood in stool. Has f/u on Thursday for further testing. Needs a work excuse.    Abdominal Pain  Associated symptoms include hematochezia. Pertinent negatives include no diarrhea, fever, nausea or vomiting.       Constitution: Negative for chills and fever.   Gastrointestinal:  Positive for abdominal pain, abdominal bloating and bright red blood in stool. Negative for nausea, vomiting and " diarrhea.        Objective:   The physical exam was conducted virtually.  Physical Exam   Constitutional: She is oriented to person, place, and time. She does not appear ill. No distress.   HENT:   Head: Normocephalic and atraumatic.   Nose: Nose normal.   Eyes: Extraocular movement intact   Pulmonary/Chest: Effort normal.   Abdominal: Normal appearance.   Musculoskeletal: Normal range of motion.         General: Normal range of motion.   Neurological: no focal deficit. She is alert and oriented to person, place, and time.   Psychiatric: Her behavior is normal. Mood normal.   Vitals reviewed.      Assessment:     1. Abdominal pain, unspecified abdominal location        Plan:     Patient encouraged to monitor symptoms closely and instructed to follow-up for new or worsening symptoms. Further, in-person, evaluation is necessary for continued treatment. Please follow up with your primary care doctor or specialist as needed. Verbally discussed plan. Patient confirms understanding and is in agreement with treatment and plan.     You must understand that you've received a Virtual Care evaluation only and that you may be released before all your medical problems are known or treated. You, the patient, will arrange for follow up care as instructed.    Abdominal pain, unspecified abdominal location

## 2024-02-18 ENCOUNTER — OFFICE VISIT (OUTPATIENT)
Dept: URGENT CARE | Facility: CLINIC | Age: 29
End: 2024-02-18
Payer: MEDICAID

## 2024-02-18 VITALS
WEIGHT: 244 LBS | OXYGEN SATURATION: 100 % | HEIGHT: 63 IN | TEMPERATURE: 98 F | BODY MASS INDEX: 43.23 KG/M2 | SYSTOLIC BLOOD PRESSURE: 116 MMHG | RESPIRATION RATE: 16 BRPM | HEART RATE: 67 BPM | DIASTOLIC BLOOD PRESSURE: 85 MMHG

## 2024-02-18 DIAGNOSIS — K08.89 PAIN, DENTAL: Primary | ICD-10-CM

## 2024-02-18 PROCEDURE — 99215 OFFICE O/P EST HI 40 MIN: CPT | Mod: PBBFAC | Performed by: NURSE PRACTITIONER

## 2024-02-18 PROCEDURE — 99213 OFFICE O/P EST LOW 20 MIN: CPT | Mod: S$PBB,,, | Performed by: NURSE PRACTITIONER

## 2024-02-18 RX ORDER — IBUPROFEN 800 MG/1
800 TABLET ORAL 3 TIMES DAILY
Qty: 15 TABLET | Refills: 0 | Status: SHIPPED | OUTPATIENT
Start: 2024-02-18 | End: 2024-02-23

## 2024-02-18 RX ORDER — HYDROCODONE BITARTRATE AND ACETAMINOPHEN 5; 325 MG/1; MG/1
1 TABLET ORAL EVERY 12 HOURS PRN
Qty: 10 TABLET | Refills: 0 | Status: SHIPPED | OUTPATIENT
Start: 2024-02-18 | End: 2024-02-23

## 2024-02-18 RX ORDER — AMOXICILLIN 875 MG/1
875 TABLET, FILM COATED ORAL EVERY 12 HOURS
Qty: 20 TABLET | Refills: 0 | Status: SHIPPED | OUTPATIENT
Start: 2024-02-18 | End: 2024-02-28

## 2024-02-18 NOTE — LETTER
February 18, 2024      [facility]  [facility] [address]  Phone: [phone]       Patient: Janet Frank   YOB: 1995  Date of Visit: 02/18/2024    To Whom It May Concern:    Ubaldo Frank  was at [facility] on 02/18/2024. The patient may return to work/school on 02/20/2024 with no restrictions. If you have any questions or concerns, or if I can be of further assistance, please do not hesitate to contact me.    Sincerely,      MICHELLE Bergeron

## 2024-02-18 NOTE — PATIENT INSTRUCTIONS
Take your prescriptions according to the directions on the labels and make sure you keep your upcoming dental appointment or obtain an appointment after approximately 7 days on antibiotics.    You can take Tylenol as well. Up to 1,000mg at a time, up to 4 times per day.  Also consider Oragel.    Soft foods or liquids.  Stop smoking if you smoke.  Avoid sugar.    Mouthwash that does not contain alcohol.

## 2024-02-18 NOTE — PROGRESS NOTES
"Subjective:      Patient ID: Janet Frank is a 28 y.o. female.    Vitals:  height is 5' 3" (1.6 m) and weight is 110.7 kg (244 lb). Her oral temperature is 98.4 °F (36.9 °C). Her blood pressure is 116/85 and her pulse is 67. Her respiration is 16 and oxygen saturation is 100%.     Chief Complaint: Other Misc (My tooth killing me - Entered by patient) and Dental Pain (Right side dental pain x 1 week. Swelling noted to right side of face.)    Dental Pain      As stated in CC. Pt states she has been taking Ibuprofen with no relief. Has not been able to find a dentist that will accept her insurance.   ROS   Objective:     Physical Exam   Constitutional: She appears well-developed.  Non-toxic appearance. She does not appear ill. No distress.   HENT:   Head: Atraumatic.   Nose: Nose normal. No purulent discharge. Right sinus exhibits no maxillary sinus tenderness and no frontal sinus tenderness. Left sinus exhibits no maxillary sinus tenderness and no frontal sinus tenderness.   Mouth/Throat: Uvula is midline. Mucous membranes are moist. Abnormal dentition. Dental caries present. No dental abscesses or uvula swelling.       Poor dentition overall, Some mucosal swelling surrounding tooth at top and bottom of right side of mouth # 32, 31, 1-3., minimal facial swelling,       Comments: Poor dentition overall, Some mucosal swelling surrounding tooth at top and bottom of right side of mouth # 32, 31, 1-3., minimal facial swelling,   Eyes: Conjunctivae are normal. Right eye exhibits no discharge. Left eye exhibits no discharge. Extraocular movement intact   Neck: Neck supple. No neck rigidity present.   Cardiovascular: Regular rhythm.   Pulmonary/Chest: Effort normal and breath sounds normal. No respiratory distress. She has no wheezes. She has no rales.   Lymphadenopathy:     She has no cervical adenopathy.   Neurological: She is alert.   Skin: Skin is warm, dry and not diaphoretic.   Psychiatric: Her behavior is normal. "   Nursing note and vitals reviewed.      Assessment:     1. Pain, dental        Plan:   Dental pain with poor dentition, educated on suspected dental abscess, prescribed amoxicillin and NSAIDs and Norco for pain control, and instructed to seek priority dental exam.   Soft foods or liquids.  Stop smoking if you smoke.  Avoid sugar.    Mouthwash that does not contain alcohol.        Pain, dental    Other orders  -     ibuprofen (ADVIL,MOTRIN) 800 MG tablet; Take 1 tablet (800 mg total) by mouth 3 (three) times daily. for 5 days  Dispense: 15 tablet; Refill: 0  -     HYDROcodone-acetaminophen (NORCO) 5-325 mg per tablet; Take 1 tablet by mouth every 12 (twelve) hours as needed for Pain.  Dispense: 10 tablet; Refill: 0  -     amoxicillin (AMOXIL) 875 MG tablet; Take 1 tablet (875 mg total) by mouth every 12 (twelve) hours. for 10 days  Dispense: 20 tablet; Refill: 0

## 2024-03-04 ENCOUNTER — PATIENT MESSAGE (OUTPATIENT)
Dept: URGENT CARE | Facility: CLINIC | Age: 29
End: 2024-03-04
Payer: COMMERCIAL

## 2024-03-10 ENCOUNTER — ON-DEMAND VIRTUAL (OUTPATIENT)
Dept: URGENT CARE | Facility: CLINIC | Age: 29
End: 2024-03-10
Payer: COMMERCIAL

## 2024-03-10 DIAGNOSIS — K08.89 PAIN, DENTAL: Primary | ICD-10-CM

## 2024-03-10 PROCEDURE — 99213 OFFICE O/P EST LOW 20 MIN: CPT | Mod: 95,,, | Performed by: PHYSICIAN ASSISTANT

## 2024-03-10 RX ORDER — CLINDAMYCIN HYDROCHLORIDE 150 MG/1
450 CAPSULE ORAL EVERY 8 HOURS
Qty: 63 CAPSULE | Refills: 0 | Status: SHIPPED | OUTPATIENT
Start: 2024-03-10 | End: 2024-03-17

## 2024-03-10 NOTE — LETTER
March 10, 2024    Janet Frank  [address] [facility] [address]             Virtual Visit - Urgent Care  Urgent Care  [address] [facility] [address]   March 10, 2024     Patient: Janet Frank   YOB: 1995   Date of Visit: 3/10/2024       To Whom it May Concern:    Janet Frank was seen virtually on 3/10/2024. Please excuse patient from work today 3/10/2024.    If you have any questions or concerns, please don't hesitate to call.    Sincerely,         Annetta Michaels PA-C

## 2024-03-11 NOTE — PATIENT INSTRUCTIONS
1. Recommend to start new antibiotic, Clindamycin. Prescription sent to your pharmacy.  2. Continue tylenol as needed for pain. Also recommend warm compresses over affected area every few hours.  3. If no improvement in 2 days recommend to be seen by your dentist or local urgent care. Please be seen sooner if any worsening pain, spreading of pain into jaw/face, facial swelling/redness, trouble swallowing or opening/closing jaw, fevers or other concerns.   4.  You must understand that you've received a Telehealth Urgent Care treatment only and that you may be released before all your medical problems are known or treated. You, the patient, will arrange for follow up care as instructed.??

## 2024-03-11 NOTE — PROGRESS NOTES
Subjective:      Patient ID: Janet Frank is a 28 y.o. female.    Vitals:  vitals were not taken for this visit.     Chief Complaint: Dental Pain      Visit Type: TELE AUDIOVISUAL    Present with the patient at the time of consultation: TELEMED PRESENT WITH PATIENT: None    Name, birth date and location verified. Currently at home in [place].    Past Medical History:   Diagnosis Date    GERD (gastroesophageal reflux disease)      History reviewed. No pertinent surgical history.  Review of patient's allergies indicates:   Allergen Reactions    Benadryl allergy decongestant Hives     Current Outpatient Medications on File Prior to Visit   Medication Sig Dispense Refill    famotidine (PEPCID) 40 MG tablet Take 1 tablet (40 mg total) by mouth every evening. 30 tablet 6    sumatriptan (IMITREX) 25 MG Tab Take 1 tablet (25 mg total) by mouth every 6 (six) hours as needed (headache). (Patient not taking: Reported on 2/8/2024) 60 tablet 2     No current facility-administered medications on file prior to visit.     Family History   Problem Relation Age of Onset    Breast cancer Mother     No Known Problems Father     Cervical cancer Maternal Grandmother        Medications Ordered                [id number] [address] & [address] [address]    Telephone: [phone]   Fax: [phone]   Hours: Open 24 hours                         E-Prescribed (1 of 1)              clindamycin (CLEOCIN) 150 MG capsule    Sig: Take 3 capsules (450 mg total) by mouth every 8 (eight) hours. for 7 days       Start: 3/10/24     Quantity: 63 capsule Refills: 0                           Ohs Peq Odvv Intake    3/10/2024 10:33 PM CDT - Filed by Patient   What is your current physical address in the event of a medical emergency? [address]   Are you able to take your vital signs? No   Please attach any relevant images or files          HPI  29yo female  presents with c/o top back right tooth pain x two weeks. States face feels swollen. Went to dentist last week and has extraction planned for 3/19. Currently on amoxicillin from dentist without improvement.   Denies fevers, trouble swallowing, trouble opening/closing jaw, neck pain/stiffness, purulent drainage.  Also taking ibuprofen and tylenol.    Denies POP.          Constitution: Negative for activity change, appetite change, chills and fever.   HENT:  Positive for dental problem. Negative for sore throat, trouble swallowing and voice change.    Neck: Negative for neck pain, neck stiffness and neck swelling.   Respiratory:  Negative for cough and shortness of breath.         Objective:   The physical exam was conducted virtually.  Physical Exam   Constitutional: She is oriented to person, place, and time.  Non-toxic appearance. She does not appear ill. No distress.   HENT:   Head: Normocephalic and atraumatic.      Comments: No facial edema or erythema noted.   Mouth/Throat: Oropharynx is clear and moist and mucous membranes are normal. No oropharyngeal exudate, posterior oropharyngeal edema or posterior oropharyngeal erythema.   Unable to get clear view of tooth.      Comments: Unable to get clear view of tooth.  Eyes: Conjunctivae are normal.   Neck: Neck supple. No neck rigidity present.   Pulmonary/Chest: Effort normal. No respiratory distress.   Abdominal: Normal appearance.   Musculoskeletal:      Cervical back: She exhibits no tenderness.   Lymphadenopathy:     She has no cervical adenopathy.   Neurological: She is alert and oriented to person, place, and time. Coordination normal.   Skin: Skin is dry, not diaphoretic, not pale and no rash.   Psychiatric: Her behavior is normal. Judgment and thought content normal.       Assessment:     1. Pain, dental        Plan:       Pain, dental    Other orders  -     clindamycin (CLEOCIN) 150 MG capsule; Take 3 capsules (450 mg total) by mouth every 8 (eight) hours.  for 7 days  Dispense: 63 capsule; Refill: 0    Stop Amoxicillin, start clindamycin. Work note for today provided per request.    1. Recommend to start new antibiotic, Clindamycin. Prescription sent to your pharmacy.  2. Continue tylenol as needed for pain. Also recommend warm compresses over affected area every few hours.  3. If no improvement in 2 days recommend to be seen by your dentist or local urgent care. Please be seen sooner if any worsening pain, spreading of pain into jaw/face, facial swelling/redness, trouble swallowing or opening/closing jaw, fevers or other concerns.   4.  You must understand that you've received a Telehealth Urgent Care treatment only and that you may be released before all your medical problems are known or treated. You, the patient, will arrange for follow up care as instructed.??  ?Patient voiced understanding and agrees to plan.

## 2024-03-12 ENCOUNTER — OFFICE VISIT (OUTPATIENT)
Dept: URGENT CARE | Facility: CLINIC | Age: 29
End: 2024-03-12
Payer: COMMERCIAL

## 2024-03-12 VITALS
HEART RATE: 94 BPM | OXYGEN SATURATION: 97 % | RESPIRATION RATE: 20 BRPM | WEIGHT: 235.88 LBS | SYSTOLIC BLOOD PRESSURE: 122 MMHG | TEMPERATURE: 99 F | DIASTOLIC BLOOD PRESSURE: 77 MMHG | BODY MASS INDEX: 41.79 KG/M2 | HEIGHT: 63 IN

## 2024-03-12 DIAGNOSIS — K08.89 PAIN, DENTAL: Primary | ICD-10-CM

## 2024-03-12 LAB
B-HCG UR QL: NEGATIVE
CTP QC/QA: YES

## 2024-03-12 PROCEDURE — 99213 OFFICE O/P EST LOW 20 MIN: CPT | Mod: S$PBB,,, | Performed by: NURSE PRACTITIONER

## 2024-03-12 PROCEDURE — 81025 URINE PREGNANCY TEST: CPT | Mod: PBBFAC | Performed by: NURSE PRACTITIONER

## 2024-03-12 PROCEDURE — 99214 OFFICE O/P EST MOD 30 MIN: CPT | Mod: PBBFAC | Performed by: NURSE PRACTITIONER

## 2024-03-12 PROCEDURE — 63600175 PHARM REV CODE 636 W HCPCS: Performed by: NURSE PRACTITIONER

## 2024-03-12 RX ORDER — KETOROLAC TROMETHAMINE 30 MG/ML
60 INJECTION, SOLUTION INTRAMUSCULAR; INTRAVENOUS
Status: COMPLETED | OUTPATIENT
Start: 2024-03-12 | End: 2024-03-12

## 2024-03-12 RX ORDER — IBUPROFEN 600 MG/1
TABLET ORAL
COMMUNITY
Start: 2024-03-05 | End: 2024-04-09

## 2024-03-12 RX ORDER — OMEPRAZOLE 20 MG/1
CAPSULE, DELAYED RELEASE ORAL
COMMUNITY
End: 2024-04-09

## 2024-03-12 RX ORDER — AMOXICILLIN 500 MG/1
1 CAPSULE ORAL
COMMUNITY
Start: 2024-03-05 | End: 2024-04-09

## 2024-03-12 RX ORDER — PENICILLIN V POTASSIUM 500 MG/1
TABLET, FILM COATED ORAL
COMMUNITY
End: 2024-04-09

## 2024-03-12 RX ORDER — ACETAMINOPHEN 500 MG
TABLET ORAL
COMMUNITY
Start: 2024-03-05 | End: 2024-04-09

## 2024-03-12 RX ADMIN — KETOROLAC TROMETHAMINE 60 MG: 30 INJECTION, SOLUTION INTRAMUSCULAR at 04:03

## 2024-03-12 NOTE — LETTER
March 12, 2024      [facility]  [facility] [address]  Phone: [phone]       Patient: Janet Frank   YOB: 1995  Date of Visit: 03/12/2024    To Whom It May Concern:    Ubaldo Frank  was at [facility] on 03/12/2024. The patient may return to work/school on MAR 13 2024 with no restrictions. If you have any questions or concerns, or if I can be of further assistance, please do not hesitate to contact me.    Sincerely,    NEGAR TOVAR NP

## 2024-03-12 NOTE — PROGRESS NOTES
"Subjective:      Patient ID: Janet Frank is a 28 y.o. female.    Vitals:  height is 5' 3" (1.6 m) and weight is 107 kg (235 lb 14.3 oz). Her temperature is 98.9 °F (37.2 °C). Her blood pressure is 122/77 and her pulse is 94. Her respiration is 20 and oxygen saturation is 97%.     Chief Complaint: Dental Pain (RT upper x 3wks   states has dental appt next wk)    Dental Pain      As stated in chief complaint.  Review records patient had a intermittent virtual visit with PCP on 3/10/2024 for dental pain was prescribed clindamycin every 8 hours for 7 days.  Patient has not picked up this medication as she states she has taken antibiotics and these are not getting any better, stating  "they do not make me feel too good".  Patient denies taking any medications for treatment over-the-counter that works and reports taking Tylenol and Motrin over-the-counter. Patient is requesting pain injection as she states her PCP recommended. LMP unknown  ROS   Objective:     Physical Exam   Constitutional: She appears well-developed.  Non-toxic appearance. She does not appear ill. No distress.   HENT:   Head: Atraumatic.   Nose: No purulent discharge. Right sinus exhibits no maxillary sinus tenderness and no frontal sinus tenderness. Left sinus exhibits no maxillary sinus tenderness and no frontal sinus tenderness.   Mouth/Throat: Uvula is midline. No oral lesions. Abnormal dentition. Dental caries present. No dental abscesses or uvula swelling.    Poor dentition throughout no erythema tenderness to palpation  to gumline oral mucosa or dental abscess noted      Comments:  Poor dentition throughout no erythema tenderness to palpation  to gumline oral mucosa or dental abscess noted  Eyes: Right eye exhibits no discharge. Left eye exhibits no discharge. Extraocular movement intact   Neck: Neck supple. No neck rigidity present.   Cardiovascular: Regular rhythm.   Pulmonary/Chest: Effort normal and breath sounds normal. No respiratory " distress. She has no wheezes. She has no rales.   Lymphadenopathy:     She has no cervical adenopathy.   Neurological: She is alert.   Skin: Skin is warm, dry and not diaphoretic.   Psychiatric: Her behavior is normal.   Nursing note and vitals reviewed.      Assessment:     1. Pain, dental      Results for orders placed or performed in visit on 03/12/24   POCT urine pregnancy   Result Value Ref Range    POC Preg Test, Ur Negative Negative     Acceptable Yes        Plan:    Encouraged compliance with antibiotics as prescribed by her physician   And discussed and instructed to prioritize  dental evaluation   As requested on her injection for pain and instructed to be discharged home with instructions to take Tylenol and Motrin as needed over-the-counter  Also consider Oragel.  Soft foods or liquids.  Stop smoking if you smoke.  Avoid sugar.  Pain, dental  -     POCT urine pregnancy    Other orders  -     ketorolac injection 60 mg

## 2024-03-12 NOTE — PATIENT INSTRUCTIONS
Continue your  antibiotic prescription according to the directions on the labels and make sure you keep your upcoming dental appointment  on or obtain an appointment after approximately 7 days on antibiotics.    You can take Tylenol as well. Up to 1,000mg at a time, up to 4 times per day.  Also consider Oragel.  Soft foods or liquids.  Stop smoking if you smoke.  Avoid sugar.    Mouthwash that does not contain alcohol.

## 2024-03-25 ENCOUNTER — TELEPHONE (OUTPATIENT)
Dept: INTERNAL MEDICINE | Facility: CLINIC | Age: 29
End: 2024-03-25
Payer: MEDICAID

## 2024-03-25 NOTE — TELEPHONE ENCOUNTER
Left vml to remind pt to collect and bring H pylori test to lab once collected. Pt advised to call back with any questions or concerns.

## 2024-03-25 NOTE — TELEPHONE ENCOUNTER
----- Message from MICHELLE Pearson sent at 3/25/2024  6:41 AM CDT -----  Please notify patient that we are still waiting on her H. Pylori test to be collected and delivered to lab. Her CT Abdomen was denied by insurance. If H. Pylori test is negative, I will refer patient to GI.

## 2024-04-01 ENCOUNTER — LAB VISIT (OUTPATIENT)
Dept: LAB | Facility: HOSPITAL | Age: 29
End: 2024-04-01
Attending: NURSE PRACTITIONER
Payer: MEDICAID

## 2024-04-01 DIAGNOSIS — K92.1 HEMATOCHEZIA: Primary | ICD-10-CM

## 2024-04-01 LAB
H. PYLORI STOOL: NEGATIVE
OB IA INT NEG CNTRL (OHS): NEGATIVE
OB IA INT POS CNTRL (OHS): POSITIVE
OCCULT BLD IA (OHS): NEGATIVE

## 2024-04-01 PROCEDURE — 87338 HPYLORI STOOL AG IA: CPT

## 2024-04-01 PROCEDURE — 82274 ASSAY TEST FOR BLOOD FECAL: CPT

## 2024-04-09 ENCOUNTER — OFFICE VISIT (OUTPATIENT)
Dept: INTERNAL MEDICINE | Facility: CLINIC | Age: 29
End: 2024-04-09
Payer: MEDICAID

## 2024-04-09 ENCOUNTER — HOSPITAL ENCOUNTER (OUTPATIENT)
Dept: RADIOLOGY | Facility: HOSPITAL | Age: 29
Discharge: HOME | End: 2024-04-09
Attending: NURSE PRACTITIONER
Payer: MEDICAID

## 2024-04-09 VITALS
HEART RATE: 84 BPM | SYSTOLIC BLOOD PRESSURE: 123 MMHG | DIASTOLIC BLOOD PRESSURE: 84 MMHG | TEMPERATURE: 98 F | BODY MASS INDEX: 42.17 KG/M2 | RESPIRATION RATE: 16 BRPM | HEIGHT: 63 IN | WEIGHT: 238 LBS

## 2024-04-09 DIAGNOSIS — E55.9 VITAMIN D DEFICIENCY: ICD-10-CM

## 2024-04-09 DIAGNOSIS — M25.511 ACUTE PAIN OF RIGHT SHOULDER: ICD-10-CM

## 2024-04-09 DIAGNOSIS — R10.84 GENERALIZED ABDOMINAL PAIN: Primary | ICD-10-CM

## 2024-04-09 DIAGNOSIS — E66.01 CLASS 3 SEVERE OBESITY DUE TO EXCESS CALORIES WITH SERIOUS COMORBIDITY AND BODY MASS INDEX (BMI) OF 40.0 TO 44.9 IN ADULT: Chronic | ICD-10-CM

## 2024-04-09 DIAGNOSIS — K21.9 GASTROESOPHAGEAL REFLUX DISEASE, UNSPECIFIED WHETHER ESOPHAGITIS PRESENT: Chronic | ICD-10-CM

## 2024-04-09 PROCEDURE — 99214 OFFICE O/P EST MOD 30 MIN: CPT | Mod: PBBFAC,25 | Performed by: NURSE PRACTITIONER

## 2024-04-09 PROCEDURE — 1160F RVW MEDS BY RX/DR IN RCRD: CPT | Mod: CPTII,,, | Performed by: NURSE PRACTITIONER

## 2024-04-09 PROCEDURE — 1159F MED LIST DOCD IN RCRD: CPT | Mod: CPTII,,, | Performed by: NURSE PRACTITIONER

## 2024-04-09 PROCEDURE — 73030 X-RAY EXAM OF SHOULDER: CPT | Mod: TC,RT

## 2024-04-09 PROCEDURE — 3074F SYST BP LT 130 MM HG: CPT | Mod: CPTII,,, | Performed by: NURSE PRACTITIONER

## 2024-04-09 PROCEDURE — 99214 OFFICE O/P EST MOD 30 MIN: CPT | Mod: S$PBB,,, | Performed by: NURSE PRACTITIONER

## 2024-04-09 PROCEDURE — 3008F BODY MASS INDEX DOCD: CPT | Mod: CPTII,,, | Performed by: NURSE PRACTITIONER

## 2024-04-09 PROCEDURE — 3079F DIAST BP 80-89 MM HG: CPT | Mod: CPTII,,, | Performed by: NURSE PRACTITIONER

## 2024-04-09 RX ORDER — ERGOCALCIFEROL 1.25 MG/1
50000 CAPSULE ORAL
Qty: 12 CAPSULE | Refills: 1 | Status: SHIPPED | OUTPATIENT
Start: 2024-04-09

## 2024-04-09 NOTE — LETTER
April 9, 2024      [facility]  [facility] [facility] [address]  Phone: [phone]       Patient: Janet Frank   YOB: 1995  Date of Visit: 04/09/2024    To Whom It May Concern:    Ubaldo Frank  was at [facility] on 04/09/2024. Please excuse Janet Frank from work for the days of 04/08/2024 and 04/09/2024. She may return to work/school on 04/10/2024 with no restrictions. If you have any questions or concerns, or if I can be of further assistance, please do not hesitate to contact me.    Sincerely,    MICHELLE Pearson

## 2024-04-09 NOTE — PROGRESS NOTES
Patient ID: [id number]     Chief Complaint: Arm Pain (1. C/O right shoulder pain for 2 days 2. Denies any abdominal pain)    HPI:     Janet Frank is a 28 y.o. female with diagnosis of chronic headaches, obesity. Patient seen in clinic today for follow up. Patient last seen in clinic on 2024.  Patient states generalized abdominal pain, worse at night. States mildly improved with Pepcid. Patient was prescribed Pantoprazole but insurance is not covering cost of medication and patient cannot afford cash vieyra. CT Abdomen was ordered, denied by insurance. H. Pylori and FIT negative. Patient denies N/V/D/C, weight loss. States she has noticed some blood in stool at times, denies hemorrhoids.   Today, patient states right shoulder pain x2 days. States she works at [employer] and does heavy lifting. Patient denies injury. Patient denies taking medication for pain.   Patient denies any other acute complaints.     Review of patient's allergies indicates:   Allergen Reactions    Benadryl allergy decongestant Hives    Diphenhydramine hcl Hives     Breast Cancer Screening: deferred due to age  Cervical Cancer Screenin2022  Colorectal Cancer Screening: deferred due to age  Diabetic Eye Exam: N/A  Diabetic Foot Exam: N/A  Lung Cancer Screening: N/A  Prostate Cancer Screening: N/A  AAA Screening: N/A  Osteoporosis Screening: deferred due to age  Medicare Wellness: N/A  Immunizations:   Immunization History   Administered Date(s) Administered    COVID-19, MRNA, LN-S, PF (Pfizer) (Purple Cap) 2021, 2021     History reviewed. No pertinent surgical history.    family history includes Breast cancer in her mother; Cervical cancer in her maternal grandmother; No Known Problems in her father.    Social History     Socioeconomic History    Marital status: Significant Other   Tobacco Use    Smoking status: Former     Types: Vaping w/o nicotine     Start date: 2022     Quit date: 2023     Years since quitting:  0.3    Smokeless tobacco: Never   Substance and Sexual Activity    Alcohol use: Yes     Comment: occassionally    Drug use: Never    Sexual activity: Yes     Partners: Male     Social Determinants of Health     Financial Resource Strain: High Risk (1/4/2024)    Overall Financial Resource Strain (CARDIA)     Difficulty of Paying Living Expenses: Very hard   Food Insecurity: Food Insecurity Present (1/4/2024)    Hunger Vital Sign     Worried About Running Out of Food in the Last Year: Often true     Ran Out of Food in the Last Year: Often true   Transportation Needs: Unmet Transportation Needs (1/4/2024)    PRAPARE - Transportation     Lack of Transportation (Medical): Yes     Lack of Transportation (Non-Medical): Yes   Physical Activity: Inactive (1/4/2024)    Exercise Vital Sign     Days of Exercise per Week: 0 days     Minutes of Exercise per Session: 0 min   Stress: No Stress Concern Present (1/4/2024)    [employer] [place] [employer] - Occupational Stress Questionnaire     Feeling of Stress : Not at all   Social Connections: Unknown (1/4/2024)    Social Connection and Isolation Panel [NHANES]     Frequency of Communication with Friends and Family: Patient declined     Frequency of Social Gatherings with Friends and Family: Twice a week     Active Member of Clubs or Organizations: No     Attends Club or Organization Meetings: Patient declined     Marital Status: Never    Housing Stability: High Risk (1/4/2024)    Housing Stability Vital Sign     Unable to Pay for Housing in the Last Year: Yes     Number of Places Lived in the Last Year: 1     Unstable Housing in the Last Year: No     Current Outpatient Medications   Medication Instructions    ergocalciferol (ERGOCALCIFEROL) 50,000 Units, Oral, Every 7 days    famotidine (PEPCID) 40 mg, Oral, Nightly       Subjective:     Review of Systems   Constitutional: Negative.    HENT: Negative.     Eyes: Negative.    Respiratory: Negative.    "  Cardiovascular: Negative.    Gastrointestinal:  Positive for abdominal pain and blood in stool.   Endocrine: Negative.    Genitourinary: Negative.    Musculoskeletal: Negative.    Skin: Negative.    Allergic/Immunologic: Negative.    Neurological: Negative.    Hematological: Negative.    Psychiatric/Behavioral: Negative.         Objective:     Visit Vitals  /84 (BP Location: Left arm, Patient Position: Sitting, BP Method: Large (Automatic))   Pulse 84   Temp 98.3 °F (36.8 °C) (Oral)   Resp 16   Ht 5' 2.99" (1.6 m)   Wt 108 kg (238 lb)   LMP 03/19/2024 (Exact Date)   BMI 42.17 kg/m²       Physical Exam  Vitals reviewed.   Constitutional:       Appearance: Normal appearance.   HENT:      Head: Normocephalic and atraumatic.      Mouth/Throat:      Mouth: Mucous membranes are moist.      Pharynx: Oropharynx is clear.   Eyes:      Extraocular Movements: Extraocular movements intact.      Conjunctiva/sclera: Conjunctivae normal.      Pupils: Pupils are equal, round, and reactive to light.   Cardiovascular:      Rate and Rhythm: Normal rate and regular rhythm.      Heart sounds: Normal heart sounds.   Pulmonary:      Effort: Pulmonary effort is normal.      Breath sounds: Normal breath sounds.   Abdominal:      General: Bowel sounds are normal.   Musculoskeletal:         General: Normal range of motion.      Cervical back: Normal range of motion.   Skin:     General: Skin is warm and dry.   Neurological:      Mental Status: She is alert and oriented to person, place, and time.   Psychiatric:         Mood and Affect: Mood normal.         Behavior: Behavior normal.       Labs Reviewed:     Hematology:  Lab Results   Component Value Date    WBC 9.41 11/28/2023    HGB 12.5 11/28/2023    HCT 38.7 11/28/2023     11/28/2023     Chemistry:  Lab Results   Component Value Date     11/28/2023    K 4.1 11/28/2023    CHLORIDE 106 11/28/2023    BUN 10.4 11/28/2023    CREATININE 0.79 11/28/2023    EGFRNORACEVR >60 " 11/28/2023    GLUCOSE 89 11/28/2023    CALCIUM 9.0 11/28/2023    ALKPHOS 61 11/28/2023    LABPROT 7.8 11/28/2023    ALBUMIN 3.9 11/28/2023    BILIDIR 0.1 10/05/2020    IBILI 0.10 10/05/2020    AST 17 11/28/2023    ALT 16 11/28/2023    QALUMDWW87PJ 9.7 (L) 04/24/2023     Lab Results   Component Value Date    HGBA1C 5.8 04/24/2023     Lipid Panel:  Lab Results   Component Value Date    CHOL 182 04/24/2023    HDL 33 (L) 04/24/2023    .00 04/24/2023    TRIG 128 04/24/2023    TOTALCHOLEST 6 (H) 04/24/2023     Thyroid:  Lab Results   Component Value Date    TSH 1.680 04/24/2023     Urine:  Lab Results   Component Value Date    COLORUA Light-Yellow 11/28/2023    APPEARANCEUA Clear 11/28/2023    SGUA 1.017 11/28/2023    PHUA 7.5 11/28/2023    PROTEINUA Negative 11/28/2023    GLUCOSEUA Normal 11/28/2023    KETONESUA Negative 11/28/2023    BLOODUA Negative 11/28/2023    NITRITESUA Negative 11/28/2023    LEUKOCYTESUR Negative 11/28/2023    RBCUA 0-5 11/28/2023    WBCUA 0-5 11/28/2023    BACTERIA Trace (A) 11/28/2023    SQEPUA Few (A) 11/28/2023    HYALINECASTS None Seen 11/28/2023        Assessment:       ICD-10-CM ICD-9-CM   1. Generalized abdominal pain  R10.84 789.07   2. Acute pain of right shoulder  M25.511 719.41   3. Vitamin D deficiency  E55.9 268.9   4. Gastroesophageal reflux disease, unspecified whether esophagitis present  K21.9 530.81   5. Class 3 severe obesity due to excess calories with serious comorbidity and body mass index (BMI) of 40.0 to 44.9 in adult  E66.01 278.01    Z68.41 V85.41       Plan:     1. Generalized abdominal pain  CT Abdomen denies by insurance  H. Pylori negative  FIT negative  GI referral ordered  - Ambulatory referral/consult to Gastroenterology; Future    2. Acute pain of right shoulder  - X-ray Shoulder 2 or More Views Right; Future    3. Vitamin D deficiency  Vitamin D level: 9  Restart Vitamin D 50,000 units weekly  - CBC Auto Differential; Future  - Vitamin D; Future    4.  Gastroesophageal reflux disease, unspecified whether esophagitis present  Continue Pepcid 40 mg Qhs  Avoid spicy foods  Remain in an upright position for at least 30 minutes after consuming meals    5. Class 3 severe obesity due to excess calories with serious comorbidity and body mass index (BMI) of 40.0 to 44.9 in adult  Body mass index is 42.17 kg/m².  TSH   Date Value Ref Range Status   04/24/2023 1.680 0.350 - 4.940 uIU/mL Final     Vit D 25 OH   Date Value Ref Range Status   04/24/2023 9.7 (L) 30.0 - 80.0 ng/mL Final     Hemoglobin A1c   Date Value Ref Range Status   04/24/2023 5.8 <=7.0 % Final   Sleep Study: none noted  Weight Loss Encouraged  Increase Physical Activity      Follow up in about 3 months (around 7/9/2024). In addition to their scheduled follow up, the patient has also been instructed to follow up on as needed basis.     MICHELLE Pearson

## 2024-04-15 ENCOUNTER — TELEPHONE (OUTPATIENT)
Dept: INTERNAL MEDICINE | Facility: CLINIC | Age: 29
End: 2024-04-15
Payer: MEDICAID

## 2024-04-15 ENCOUNTER — PATIENT MESSAGE (OUTPATIENT)
Dept: INTERNAL MEDICINE | Facility: CLINIC | Age: 29
End: 2024-04-15
Payer: MEDICAID

## 2024-04-15 NOTE — TELEPHONE ENCOUNTER
Please notify patient that X-ray did not show any acute findings. I can refer patient to physical therapy if still having pain.

## 2024-04-16 NOTE — TELEPHONE ENCOUNTER
Pt advised of results, verbalized understanding with no questions or concerns. Pt would like to proceed with PT at this time. Pt also would like to know if you can provide her with a work letter stating to avoid heavy lifting ([employer]).

## 2024-04-17 DIAGNOSIS — M25.511 ACUTE PAIN OF RIGHT SHOULDER: Primary | ICD-10-CM

## 2024-04-22 ENCOUNTER — TELEPHONE (OUTPATIENT)
Dept: INTERNAL MEDICINE | Facility: CLINIC | Age: 29
End: 2024-04-22
Payer: MEDICAID

## 2024-04-22 NOTE — TELEPHONE ENCOUNTER
----- Message from Arslan Monge sent at 4/22/2024  9:35 AM CDT -----  Type:  Needs Medical Advice    Who Called: pt   Best Call Back Number:  [phone]  Additional Information: calling to follow up on paper work for accommodations, asked for a call back

## 2024-04-29 ENCOUNTER — TELEPHONE (OUTPATIENT)
Dept: INTERNAL MEDICINE | Facility: CLINIC | Age: 29
End: 2024-04-29
Payer: MEDICAID

## 2024-04-29 NOTE — TELEPHONE ENCOUNTER
Spoke to pt. Pt says she sent over work accomodation form through her [employer]. Pt says she would like to know if paperwork has been completed by her PCP because she returns to work on tomorrow . Informed pt paperwork has not been signed , but has been received, printed , and placed in provider's folder to complete. Pt verbalized understanding.

## 2024-05-08 ENCOUNTER — TELEPHONE (OUTPATIENT)
Dept: INTERNAL MEDICINE | Facility: CLINIC | Age: 29
End: 2024-05-08
Payer: MEDICAID

## 2024-05-08 NOTE — TELEPHONE ENCOUNTER
----- Message from MICHELLE Pearson sent at 5/8/2024  1:02 PM CDT -----  Regarding: RE: accommodation paper work needed  Completed. Patient can .  ----- Message -----  From: Charlotte Stokes LPN  Sent: 5/8/2024   8:35 AM CDT  To: MICHELLE Pearson  Subject: FW: accommodation paper work needed              It is in your folder Kristen said. She sent it through the portal so I printed and scanned it and routed it to you just in case it isnt in your folder. So it is now in her media as well.  ----- Message -----  From: Mohini Sibley FNP  Sent: 5/8/2024   6:53 AM CDT  To: [facility]jo D[facility] Staff  Subject: RE: accommodation paper work needed              I don't see any paperwork in her chart. Please call patient for paperwork.  ----- Message -----  From: Kristen Santana LPN  Sent: 5/6/2024  10:23 AM CDT  To: MICHELLE Pearson  Subject: FW: accommodation paper work needed                ----- Message -----  From: Eileen Loyd  Sent: 5/6/2024   7:21 AM CDT  To: Maryjo DIAZ Staff  Subject: accommodation paper work needed                  Type:  Needs Medical Advice    Who Called:June    Would the patient rather a call back or a response via [facility]? Call back    Best Call Back Number: [phone]    Additional Information: June is requesting a call back regarding her accommodations at work. She stated the sent the Return to work and accomodation paperwork  through [facility].  She also states she will be returning to work today. Her job is requesting this paperwork to return.

## 2024-05-08 NOTE — TELEPHONE ENCOUNTER
Called pt to notify her that her paperwork was complete however she did not answer the phone and I was unable to leave  due to one not being set up.   LD

## 2024-05-19 ENCOUNTER — ON-DEMAND VIRTUAL (OUTPATIENT)
Dept: URGENT CARE | Facility: CLINIC | Age: 29
End: 2024-05-19
Payer: MEDICAID

## 2024-05-19 DIAGNOSIS — M25.519 SHOULDER PAIN, UNSPECIFIED CHRONICITY, UNSPECIFIED LATERALITY: Primary | ICD-10-CM

## 2024-05-19 PROCEDURE — 99213 OFFICE O/P EST LOW 20 MIN: CPT | Mod: 95,,, | Performed by: NURSE PRACTITIONER

## 2024-05-19 NOTE — PROGRESS NOTES
Subjective:      Patient ID: Janet Frank is a 28 y.o. female.    Vitals:  vitals were not taken for this visit.     Chief Complaint: Shoulder Pain      Visit Type: TELE AUDIOVISUAL    Present with the patient at the time of consultation: TELEMED PRESENT WITH PATIENT: None        Past Medical History:   Diagnosis Date    GERD (gastroesophageal reflux disease)      History reviewed. No pertinent surgical history.  Review of patient's allergies indicates:   Allergen Reactions    Benadryl allergy decongestant Hives    Diphenhydramine hcl Hives     Current Outpatient Medications on File Prior to Visit   Medication Sig Dispense Refill    ergocalciferol (ERGOCALCIFEROL) 50,000 unit Cap Take 1 capsule (50,000 Units total) by mouth every 7 days. 12 capsule 1    famotidine (PEPCID) 40 MG tablet Take 1 tablet (40 mg total) by mouth every evening. 30 tablet 6     No current facility-administered medications on file prior to visit.     Family History   Problem Relation Name Age of Onset    Breast cancer Mother      No Known Problems Father      Cervical cancer Maternal Grandmother             Ohs Peq Odvv Intake    5/19/2024 10:42 AM CDT - Filed by Patient   What is your current physical address in the event of a medical emergency? [address]   Are you able to take your vital signs? No   Please attach any relevant images or files          29 yo female with c/o shoulder pain. Patient states she has been undergoing physical therapy for pain and could not make it to work today.         Constitution: Negative.   HENT: Negative.     Cardiovascular: Negative.    Respiratory: Negative.     Gastrointestinal: Negative.    Endocrine: negative.   Genitourinary: Negative.  Negative for frequency and urgency.   Musculoskeletal:  Positive for joint pain.   Skin: Negative.    Allergic/Immunologic: Negative.    Neurological: Negative.    Hematologic/Lymphatic: Negative.    Psychiatric/Behavioral: Negative.          Objective:   The  physical exam was conducted virtually.  LOCATION OF PATIENT home  Physical Exam   Constitutional: She is oriented to person, place, and time. She appears well-developed.   HENT:   Head: Normocephalic and atraumatic.   Ears:   Right Ear: Hearing, tympanic membrane and external ear normal.   Left Ear: Hearing, tympanic membrane and external ear normal.   Nose: Nose normal.   Mouth/Throat: Uvula is midline, oropharynx is clear and moist and mucous membranes are normal.   Eyes: Conjunctivae and EOM are normal. Pupils are equal, round, and reactive to light.   Neck: Neck supple.   Cardiovascular: Normal rate.   Pulmonary/Chest: Effort normal and breath sounds normal.   Musculoskeletal: Normal range of motion.         General: Normal range of motion.   Neurological: She is alert and oriented to person, place, and time.   Skin: Skin is warm.   Psychiatric: Her behavior is normal. Thought content normal.   Nursing note and vitals reviewed.      Assessment:     1. Shoulder pain, unspecified chronicity, unspecified laterality        Plan:     Follow up with your primary care provider if symptoms persist.  Go to the Emergency room for worsening of symptoms.     Shoulder pain, unspecified chronicity, unspecified laterality

## 2024-05-19 NOTE — LETTER
May 19, 2024    Janet Frank  [address] [facility] [address]             Virtual Visit - Urgent Care  Urgent Care  [address] [facility] [address]   May 19, 2024     Patient: Janet Frank   YOB: 1995   Date of Visit: 5/19/2024       To Whom it May Concern:    Janet Frank was seen virtually on 5/19/2024. She may return to work on 05/20/2024 .    Please excuse her from any classes or work missed.    If you have any questions or concerns, please don't hesitate to call.    Sincerely,           Joanna Robin, YOLAP

## 2024-05-20 ENCOUNTER — TELEPHONE (OUTPATIENT)
Dept: INTERNAL MEDICINE | Facility: CLINIC | Age: 29
End: 2024-05-20
Payer: MEDICAID

## 2024-05-20 NOTE — TELEPHONE ENCOUNTER
----- Message from [facility] sent at 5/20/2024 11:24 AM CDT -----  .Type:  Needs Medical Advice    Who Called:  pt    Symptoms (please be specific):  no     How long has patient had these symptoms:   no    Pharmacy name and phone #:   no    Would the patient rather a call back or a response via [facility]?      Best Call Back Number:  [phone]    Additional Information:  pt has accommodations papers for her job that she needs filled out she would like to speak to the nurse

## 2024-05-23 NOTE — TELEPHONE ENCOUNTER
Called and spoke to pt. Informed pt of PCP's message below. Pt stated she picked up paperwork from clinic.

## 2024-05-31 ENCOUNTER — TELEPHONE (OUTPATIENT)
Dept: INTERNAL MEDICINE | Facility: CLINIC | Age: 29
End: 2024-05-31
Payer: MEDICAID

## 2024-05-31 NOTE — TELEPHONE ENCOUNTER
----- Message from Carlos Simms sent at 5/31/2024 11:08 AM CDT -----  .Type:  Needs Medical Advice    Who Called: Falisia  Symptoms (please be specific):    How long has patient had these symptoms:    Pharmacy name and phone #:    Would the patient rather a call back or a response via [facility]?   Best Call Back Number: [phone]  Additional Information: Patient requested a call back from office she is looking to see if her doctor had completed the paperwork.

## 2024-06-14 ENCOUNTER — TELEPHONE (OUTPATIENT)
Dept: INTERNAL MEDICINE | Facility: CLINIC | Age: 29
End: 2024-06-14
Payer: MEDICAID

## 2024-07-11 ENCOUNTER — ON-DEMAND VIRTUAL (OUTPATIENT)
Dept: URGENT CARE | Facility: CLINIC | Age: 29
End: 2024-07-11
Payer: MEDICAID

## 2024-07-11 DIAGNOSIS — J06.9 UPPER RESPIRATORY TRACT INFECTION, UNSPECIFIED TYPE: Primary | ICD-10-CM

## 2024-07-11 PROCEDURE — 99213 OFFICE O/P EST LOW 20 MIN: CPT | Mod: 95,,, | Performed by: PHYSICIAN ASSISTANT

## 2024-07-11 RX ORDER — FLUTICASONE PROPIONATE 50 MCG
1 SPRAY, SUSPENSION (ML) NASAL DAILY
Qty: 9.9 ML | Refills: 0 | Status: SHIPPED | OUTPATIENT
Start: 2024-07-11

## 2024-07-11 RX ORDER — BENZONATATE 200 MG/1
200 CAPSULE ORAL 3 TIMES DAILY PRN
Qty: 20 CAPSULE | Refills: 0 | Status: SHIPPED | OUTPATIENT
Start: 2024-07-11 | End: 2024-07-21

## 2024-07-11 NOTE — PROGRESS NOTES
Subjective:      Patient ID: Janet Frank is a 28 y.o. female.    Vitals:  vitals were not taken for this visit.     Chief Complaint: Cough      Visit Type: TELE AUDIOVISUAL    Present with the patient at the time of consultation: TELEMED PRESENT WITH PATIENT: None    Past Medical History:   Diagnosis Date    GERD (gastroesophageal reflux disease)      No past surgical history on file.  Review of patient's allergies indicates:   Allergen Reactions    Benadryl allergy decongestant Hives    Diphenhydramine hcl Hives     Current Outpatient Medications on File Prior to Visit   Medication Sig Dispense Refill    ergocalciferol (ERGOCALCIFEROL) 50,000 unit Cap Take 1 capsule (50,000 Units total) by mouth every 7 days. 12 capsule 1    famotidine (PEPCID) 40 MG tablet Take 1 tablet (40 mg total) by mouth every evening. 30 tablet 6     No current facility-administered medications on file prior to visit.     Family History   Problem Relation Name Age of Onset    Breast cancer Mother      No Known Problems Father      Cervical cancer Maternal Grandmother         Medications Ordered                [id number] at [address] AT    [address] [facility] [address]    Telephone: [phone]   Fax: [phone]   Hours: Not open 24 hours                         E-Prescribed (2 of 2)              benzonatate (TESSALON) 200 MG capsule    Sig: Take 1 capsule (200 mg total) by mouth 3 (three) times daily as needed for Cough.       Start: 24     Quantity: 20 capsule Refills: 0                         fluticasone propionate (FLONASE) 50 mcg/actuation nasal spray    Si spray (50 mcg total) by Each Nostril route once daily.       Start: 24     Quantity: 9.9 mL Refills: 0                           Ohs Peq Odvv Intake    2024  2:46 PM CDT - Filed by Patient   What is your current physical address in the event of a medical emergency? [address]   Are you able  to take your vital signs? No   Please attach any relevant images or files          HPI  29yo female presents with c/o headache, nasal congestion, sneezing, cough x two days. Denies fevers, chills, chest pain, dyspnea, wheezing. No known sick contacts.     Denies POP.        Constitution: Negative for chills and fever.   HENT:  Positive for congestion and sinus pressure. Negative for ear pain and sore throat.    Respiratory:  Positive for cough. Negative for chest tightness, sputum production, shortness of breath and wheezing.    Gastrointestinal:  Negative for abdominal pain, nausea, vomiting and diarrhea.   Skin:  Positive for rash.   Neurological:  Positive for headaches.        Objective:   The physical exam was conducted virtually.  Physical Exam   Constitutional: She is oriented to person, place, and time.  Non-toxic appearance. She does not appear ill. No distress.   HENT:   Head: Normocephalic and atraumatic.   Nose: Right sinus exhibits maxillary sinus tenderness. Right sinus exhibits no frontal sinus tenderness. Left sinus exhibits maxillary sinus tenderness. Left sinus exhibits no frontal sinus tenderness.   Eyes: Conjunctivae are normal.   Neck: Neck supple.   Pulmonary/Chest: Effort normal. No respiratory distress.   Abdominal: Normal appearance.   Lymphadenopathy:     She has no cervical adenopathy.   Neurological: She is alert and oriented to person, place, and time. Coordination normal.   Skin: Skin is dry, not diaphoretic, not pale and no rash.   Psychiatric: Her behavior is normal. Judgment and thought content normal.       Assessment:     1. Upper respiratory tract infection, unspecified type        Plan:       Upper respiratory tract infection, unspecified type    Other orders  -     fluticasone propionate (FLONASE) 50 mcg/actuation nasal spray; 1 spray (50 mcg total) by Each Nostril route once daily.  Dispense: 9.9 mL; Refill: 0  -     benzonatate (TESSALON) 200 MG capsule; Take 1 capsule (200 mg  total) by mouth 3 (three) times daily as needed for Cough.  Dispense: 20 capsule; Refill: 0      1. Prescriptions have been sent to pharmacy, please take as directed. As discussed, please also take COVID test and if positive please recheck here for further guidance.   2. Push fluids, goal is urine to be light yellow/clear for adequate hydration. Rest.   3. Recommend Mucinex for sinus/nasal/chest congestion. Can also use nasal saline rinses/netti pot.   4. Follow up in 5-7 days with your PCP or one of our local urgent cares if not improving, sooner if any worsening or new symptoms. Precautions discussed.   5. You must understand that you've received a Telehealth Urgent Care treatment only and that you may be released before all your medical problems are known or treated. You, the patient, will arrange for follow up care as instructed.??Patient voiced understanding and agrees to plan.
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
## 2024-07-31 ENCOUNTER — ON-DEMAND VIRTUAL (OUTPATIENT)
Dept: URGENT CARE | Facility: CLINIC | Age: 29
End: 2024-07-31
Payer: MEDICAID

## 2024-07-31 DIAGNOSIS — J98.8 VIRAL RESPIRATORY ILLNESS: Primary | ICD-10-CM

## 2024-07-31 DIAGNOSIS — B97.89 VIRAL RESPIRATORY ILLNESS: Primary | ICD-10-CM

## 2024-07-31 PROCEDURE — 99213 OFFICE O/P EST LOW 20 MIN: CPT | Mod: 95,,,

## 2024-07-31 RX ORDER — FLUTICASONE PROPIONATE 50 MCG
1 SPRAY, SUSPENSION (ML) NASAL DAILY
Qty: 9.9 ML | Refills: 0 | Status: SHIPPED | OUTPATIENT
Start: 2024-07-31

## 2024-07-31 NOTE — PROGRESS NOTES
Subjective:      Patient ID: Janet Frank is a 29 y.o. female at home    Vitals:  vitals were not taken for this visit.     Chief Complaint: Sinus Problem      Visit Type: TELE AUDIOVISUAL    Present with the patient at the time of consultation: TELEMED PRESENT WITH PATIENT: None    Past Medical History:   Diagnosis Date    GERD (gastroesophageal reflux disease)      History reviewed. No pertinent surgical history.  Review of patient's allergies indicates:   Allergen Reactions    Benadryl allergy decongestant Hives    Diphenhydramine hcl Hives     Current Outpatient Medications on File Prior to Visit   Medication Sig Dispense Refill    ergocalciferol (ERGOCALCIFEROL) 50,000 unit Cap Take 1 capsule (50,000 Units total) by mouth every 7 days. 12 capsule 1    famotidine (PEPCID) 40 MG tablet Take 1 tablet (40 mg total) by mouth every evening. 30 tablet 6    [DISCONTINUED] fluticasone propionate (FLONASE) 50 mcg/actuation nasal spray 1 spray (50 mcg total) by Each Nostril route once daily. 9.9 mL 0     No current facility-administered medications on file prior to visit.     Family History   Problem Relation Name Age of Onset    Breast cancer Mother      No Known Problems Father      Cervical cancer Maternal Grandmother         Medications Ordered                [id number] [address] & [address] [address]    Telephone: [phone]   Fax: [phone]   Hours: Open 24 hours                         E-Prescribed (1 of 1)              fluticasone propionate (FLONASE) 50 mcg/actuation nasal spray    Si spray (50 mcg total) by Each Nostril route once daily.       Start: 24     Quantity: 9.9 mL Refills: 0                           Ohs Peq Odvv Intake    2024  4:41 PM CDT - Filed by Patient   What is your current physical address in the event of a medical emergency? [address]   Are you able to take your vital  signs? No   Please attach any relevant images or files          Patient states that she woke up with sinus pressure and a sore throat and sinus congestion. Pt denies any fever or other symptoms at this time. Pt states that she has not taken anything for her symptoms at this time. Pt states that she left work today and needs a work note        HENT:  Positive for congestion, sinus pain and sore throat.         Objective:   The physical exam was conducted virtually.  Physical Exam   Constitutional: She is oriented to person, place, and time.   HENT:   Head: Normocephalic and atraumatic.   Nose: Congestion present.   Eyes: Conjunctivae are normal. Pupils are equal, round, and reactive to light. Extraocular movement intact   Neck: Neck supple.   Pulmonary/Chest: Effort normal.   Abdominal: Normal appearance.   Musculoskeletal: Normal range of motion.         General: Normal range of motion.   Neurological: no focal deficit. She is alert, oriented to person, place, and time and at baseline.   Skin: Skin is warm.   Psychiatric: Her behavior is normal. Mood, judgment and thought content normal.       Assessment:     1. Viral respiratory illness        Plan:       Viral respiratory illness  -     fluticasone propionate (FLONASE) 50 mcg/actuation nasal spray; 1 spray (50 mcg total) by Each Nostril route once daily.  Dispense: 9.9 mL; Refill: 0

## 2024-07-31 NOTE — LETTER
July 31, 2024    Janet Frank  [address] [facility] [address]             Virtual Visit - Urgent Care  Urgent Care  [address] [facility] [address]   July 31, 2024     Patient: Janet Frank   YOB: 1995   Date of Visit: 7/31/2024       To Whom it May Concern:    Janet Frank was seen virtually on 7/31/2024. She may return to work on 08/02/2024 .    Please excuse her from any classes or work missed.    If you have any questions or concerns, please don't hesitate to call.    Sincerely,         Catarina Mehta NP

## 2024-08-08 ENCOUNTER — HOSPITAL ENCOUNTER (EMERGENCY)
Facility: HOSPITAL | Age: 29
Discharge: HOME | End: 2024-08-08
Attending: STUDENT IN AN ORGANIZED HEALTH CARE EDUCATION/TRAINING PROGRAM
Payer: MEDICAID

## 2024-08-08 VITALS
WEIGHT: 242 LBS | HEIGHT: 63 IN | HEART RATE: 78 BPM | DIASTOLIC BLOOD PRESSURE: 73 MMHG | TEMPERATURE: 98 F | OXYGEN SATURATION: 98 % | BODY MASS INDEX: 42.88 KG/M2 | RESPIRATION RATE: 18 BRPM | SYSTOLIC BLOOD PRESSURE: 114 MMHG

## 2024-08-08 DIAGNOSIS — G44.209 ACUTE NON INTRACTABLE TENSION-TYPE HEADACHE: Primary | ICD-10-CM

## 2024-08-08 LAB — B-HCG UR QL: NEGATIVE

## 2024-08-08 PROCEDURE — 99284 EMERGENCY DEPT VISIT MOD MDM: CPT | Mod: 25

## 2024-08-08 PROCEDURE — 96372 THER/PROPH/DIAG INJ SC/IM: CPT

## 2024-08-08 PROCEDURE — 63600175 PHARM REV CODE 636 W HCPCS

## 2024-08-08 PROCEDURE — 81025 URINE PREGNANCY TEST: CPT

## 2024-08-08 PROCEDURE — 25000003 PHARM REV CODE 250

## 2024-08-08 RX ORDER — KETOROLAC TROMETHAMINE 30 MG/ML
60 INJECTION, SOLUTION INTRAMUSCULAR; INTRAVENOUS
Status: COMPLETED | OUTPATIENT
Start: 2024-08-08 | End: 2024-08-08

## 2024-08-08 RX ORDER — BUTALBITAL, ACETAMINOPHEN AND CAFFEINE 50; 325; 40 MG/1; MG/1; MG/1
1 TABLET ORAL EVERY 6 HOURS PRN
Qty: 12 TABLET | Refills: 0 | Status: SHIPPED | OUTPATIENT
Start: 2024-08-08

## 2024-08-08 RX ORDER — KETOROLAC TROMETHAMINE 10 MG/1
10 TABLET, FILM COATED ORAL EVERY 6 HOURS PRN
Qty: 20 TABLET | Refills: 0 | Status: SHIPPED | OUTPATIENT
Start: 2024-08-08 | End: 2024-08-13

## 2024-08-08 RX ORDER — BUTALBITAL, ACETAMINOPHEN AND CAFFEINE 50; 325; 40 MG/1; MG/1; MG/1
1 TABLET ORAL
Status: COMPLETED | OUTPATIENT
Start: 2024-08-08 | End: 2024-08-08

## 2024-08-08 RX ADMIN — BUTALBITAL, ACETAMINOPHEN, AND CAFFEINE 1 TABLET: 50; 325; 40 TABLET ORAL at 12:08

## 2024-08-08 RX ADMIN — KETOROLAC TROMETHAMINE 60 MG: 30 INJECTION, SOLUTION INTRAMUSCULAR at 01:08

## 2024-08-08 NOTE — ED PROVIDER NOTES
Encounter Date: 2024       History     Chief Complaint   Patient presents with    Headache     Complaining of headache since 0900 today; hx of chronic headaches; neuro intact, steady gait     29 y.o.  female with a history of headaches and GERD presents to Emergency Department with a chief complaint of headache. Symptoms began today and have been constant since onset. Associated symptoms include photosensitivity. The patient denies CP, SOB, fever, chills, cough, or dizziness. She took no medication prior to arrival. Reports she has recurrent headaches. No other reported symptoms at this time      The history is provided by the patient. No  was used.   Headache   This is a new problem. The current episode started today. The problem occurs constantly. The problem has been unchanged. The pain is located in the Bilateral region. The pain does not radiate. Associated symptoms include photophobia. Pertinent negatives include no abdominal pain, back pain, blurred vision, coughing, dizziness, fever, nausea, vomiting, weakness or weight loss. The symptoms are aggravated by emotional stress. She has tried nothing for the symptoms.     Review of patient's allergies indicates:   Allergen Reactions    Benadryl allergy decongestant Hives    Diphenhydramine hcl Hives     Past Medical History:   Diagnosis Date    GERD (gastroesophageal reflux disease)     Headache      History reviewed. No pertinent surgical history.  Family History   Problem Relation Name Age of Onset    Breast cancer Mother      No Known Problems Father      Cervical cancer Maternal Grandmother       Social History     Tobacco Use    Smoking status: Former     Types: Vaping w/o nicotine     Start date: 2022     Quit date: 2023     Years since quittin.6    Smokeless tobacco: Never   Substance Use Topics    Alcohol use: Yes     Comment: occassionally    Drug use: Never     Review of Systems   Constitutional:   Negative for chills, fatigue, fever and weight loss.   Eyes:  Positive for photophobia. Negative for blurred vision and visual disturbance.   Respiratory:  Negative for cough, shortness of breath, wheezing and stridor.    Cardiovascular:  Negative for palpitations and leg swelling.   Gastrointestinal:  Negative for abdominal pain, nausea and vomiting.   Musculoskeletal:  Negative for back pain.   Neurological:  Positive for headaches. Negative for dizziness, syncope, speech difficulty and weakness.   All other systems reviewed and are negative.      Physical Exam     Initial Vitals [08/08/24 1228]   BP Pulse Resp Temp SpO2   (!) 149/92 90 18 98.2 °F (36.8 °C) 99 %      MAP       --         Physical Exam    Nursing note and vitals reviewed.  Constitutional: She appears well-developed and well-nourished. She is not diaphoretic. She is cooperative.  Non-toxic appearance. No distress.   HENT:   Head: Normocephalic and atraumatic. Head is without contusion, without right periorbital erythema and without left periorbital erythema.   Right Ear: External ear normal.   Left Ear: External ear normal.   Nose: Nose normal.   Eyes: Conjunctivae and EOM are normal. Pupils are equal, round, and reactive to light.   Neck: Neck supple.   Normal range of motion.  Cardiovascular:  Normal rate, regular rhythm, S1 normal, S2 normal, normal heart sounds, intact distal pulses and normal pulses.           Pulmonary/Chest: Effort normal and breath sounds normal. No tachypnea and no bradypnea. No respiratory distress. She has no decreased breath sounds. She has no wheezes. She has no rhonchi. She has no rales. She exhibits no tenderness.   Abdominal: Abdomen is soft. Bowel sounds are normal. She exhibits no distension. There is no abdominal tenderness. There is no rebound.   Musculoskeletal:         General: Normal range of motion.      Cervical back: Normal range of motion and neck supple.     Neurological: She is alert and oriented to  person, place, and time. She has normal strength. No sensory deficit. She displays a negative Romberg sign. GCS score is 15. GCS eye subscore is 4. GCS verbal subscore is 5. GCS motor subscore is 6.   Mental status   Alert and attentive   Speech clear and fluent with normal comprehension   Able to provide clear account of historical and recent events   Oriented to person place and time     Motor exam   Coordination:  No nystagmus, no saccadic pursuit on eye range of motion    Gait:  Normal gait (standing, toe to heal, and tandem)  Sensation: Deferred     Skin: Skin is warm and dry. Capillary refill takes less than 2 seconds.   Psychiatric: She has a normal mood and affect. Thought content normal.         ED Course   Procedures  Labs Reviewed   PREGNANCY TEST, URINE RAPID - Normal       Result Value    hCG Qualitative, Urine Negative            Imaging Results    None          Medications   butalbital-acetaminophen-caffeine -40 mg per tablet 1 tablet (1 tablet Oral Given 8/8/24 1248)   ketorolac injection 60 mg (60 mg Intramuscular Given 8/8/24 1329)     Medical Decision Making  Patient awake, alert, has non-labored breathing, and follows commands appropriately. Arrived to ED due to headache and light sensitivity. Symptoms began today. Did not take anything prior to arrival. Afebrile. NAD Noted.           Differential Diagnosis: Headache, Migraine, Tension Headache, Cluster Headache     Amount and/or Complexity of Data Reviewed  Labs: ordered.  Discussion of management or test interpretation with external provider(s): At time of discharge, patient reports improvement of symptoms, VSS, patient neurologically intact, and patient has no additional complaints. Reports headache is similar to previous headaches. Prescribed medications for treatment. Discussed plan of care and interventions with patient. Agreed to and aware of plan of care. Comfortable being discharged home. Patient discharged home. Patient denies new  or additional complaints; no further tests indicated at this time. Verbalized understanding of instructions. No emergent or apparent distress noted prior to discharge. To follow up with PCP in 1 week as needed. Strict ER return precautions given.       Risk  OTC drugs.  Prescription drug management.               ED Course as of 08/08/24 2147   Thu Aug 08, 2024   1322 Patient reports slight improvement in headache after med admin. Will given dose of IM Toradol and reassess.  [JA]   1351 Patient resting upon reassessment, easily aroused with verbal stimuli. Patient reports moderate improvement in symptoms after med admin. Patient reports she has a PCP but has not seen neurology in several years. Due to recurrent headaches, last headache being 2 weeks ago, instructed patient to f/u with PCP for possible referral to neurology. Verbalized understanding. Will prescribe medications for symptoms. Patient neurologically intact at time of discharge and has no additional complaints. Comfortable with plan of care and being discharged home. Strict ER return precautions given.  [JA]      ED Course User Index  [JA] Simran Torres NP                           Clinical Impression:  Final diagnoses:  [G44.209] Acute non intractable tension-type headache (Primary)          ED Disposition Condition    Discharge Stable          ED Prescriptions       Medication Sig Dispense Start Date End Date Auth. Provider    butalbital-acetaminophen-caffeine -40 mg (FIORICET, ESGIC) -40 mg per tablet Take 1 tablet by mouth every 6 (six) hours as needed for Headaches. 12 tablet 8/8/2024 -- Simran Torres, NP    ketorolac (TORADOL) 10 mg tablet Take 1 tablet (10 mg total) by mouth every 6 (six) hours as needed for Pain. 20 tablet 8/8/2024 8/13/2024 Simran Torres, RAYMOND          Follow-up Information       Follow up With Specialties Details Why Contact Info Additional Information    Mohini Sibley, [facility] Family Medicine Call in 1  week If symptoms worsen, As needed [address] [facility] [address]  [phone]       [facility] - Emergency Dept Emergency Medicine Go to  If symptoms worsen, As needed [address]  [facility] [id number] [phone]     [facility] Neu[facility] Neurology Call  As needed, If symptoms worsen [address] [facility] [id number] [phone] Entrance 1             Simran Torres, NP  08/08/24 1358       Simran Torres, RAYMOND  08/08/24 2147

## 2024-08-08 NOTE — Clinical Note
"Janet Harrisonjona Frank was seen and treated in our emergency department on 8/8/2024.  She may return to work on 08/09/2024.       If you have any questions or concerns, please don't hesitate to call.      fredy LARRY    "

## 2024-08-22 ENCOUNTER — ON-DEMAND VIRTUAL (OUTPATIENT)
Dept: URGENT CARE | Facility: CLINIC | Age: 29
End: 2024-08-22
Payer: MEDICAID

## 2024-08-22 ENCOUNTER — OFFICE VISIT (OUTPATIENT)
Dept: URGENT CARE | Facility: CLINIC | Age: 29
End: 2024-08-22
Payer: MEDICAID

## 2024-08-22 VITALS
RESPIRATION RATE: 17 BRPM | BODY MASS INDEX: 42.99 KG/M2 | HEART RATE: 89 BPM | TEMPERATURE: 98 F | DIASTOLIC BLOOD PRESSURE: 86 MMHG | SYSTOLIC BLOOD PRESSURE: 119 MMHG | WEIGHT: 242.63 LBS | HEIGHT: 63 IN | OXYGEN SATURATION: 100 %

## 2024-08-22 DIAGNOSIS — G44.201 ACUTE INTRACTABLE TENSION-TYPE HEADACHE: Primary | ICD-10-CM

## 2024-08-22 DIAGNOSIS — R51.9 ACUTE INTRACTABLE HEADACHE, UNSPECIFIED HEADACHE TYPE: Primary | ICD-10-CM

## 2024-08-22 PROCEDURE — 99214 OFFICE O/P EST MOD 30 MIN: CPT | Mod: PBBFAC

## 2024-08-22 PROCEDURE — 63600175 PHARM REV CODE 636 W HCPCS

## 2024-08-22 PROCEDURE — 99213 OFFICE O/P EST LOW 20 MIN: CPT | Mod: S$PBB,,,

## 2024-08-22 RX ORDER — KETOROLAC TROMETHAMINE 30 MG/ML
30 INJECTION, SOLUTION INTRAMUSCULAR; INTRAVENOUS
Status: COMPLETED | OUTPATIENT
Start: 2024-08-22 | End: 2024-08-22

## 2024-08-22 RX ORDER — METOCLOPRAMIDE 10 MG/1
10 TABLET ORAL 3 TIMES DAILY PRN
Qty: 15 TABLET | Refills: 0 | Status: SHIPPED | OUTPATIENT
Start: 2024-08-22 | End: 2024-08-27

## 2024-08-22 RX ORDER — DEXAMETHASONE SODIUM PHOSPHATE 10 MG/ML
10 INJECTION INTRAMUSCULAR; INTRAVENOUS
Status: COMPLETED | OUTPATIENT
Start: 2024-08-22 | End: 2024-08-22

## 2024-08-22 RX ORDER — METOCLOPRAMIDE HYDROCHLORIDE 5 MG/ML
10 INJECTION INTRAMUSCULAR; INTRAVENOUS
Status: DISCONTINUED | OUTPATIENT
Start: 2024-08-22 | End: 2024-08-22

## 2024-08-22 RX ADMIN — KETOROLAC TROMETHAMINE 30 MG: 30 INJECTION, SOLUTION INTRAMUSCULAR; INTRAVENOUS at 03:08

## 2024-08-22 RX ADMIN — DEXAMETHASONE SODIUM PHOSPHATE 10 MG: 10 INJECTION INTRAMUSCULAR; INTRAVENOUS at 03:08

## 2024-08-22 NOTE — LETTER
August 22, 2024      Virtual Visit - Urgent Care  [address] [facility] [address]       Patient: Janet Frank   YOB: 1995  Date of Visit: 08/22/2024    To Whom It May Concern:    Ubaldo Frank  was at [facility] on 08/22/2024. The patient may return to work/school on 8/23/2024 with no restrictions. If you have any questions or concerns, or if I can be of further assistance, please do not hesitate to contact me.    Sincerely,    GEOVANNY Marquez Jr., MD

## 2024-08-22 NOTE — NURSING
Dexamethasone 10 mg administered to Left Gluteal using aseptic technique. Patient observed for 15 minutes for reactions, none noted. Patient tolerated well.    Toradol 30 mg administered to Right Gluteal using aseptic technique. Patient observed for 15 minutes for reactions, none noted. Patient tolerated well.

## 2024-08-22 NOTE — LETTER
August 22, 2024      [facility]  [address] [facility] [address]  Phone: [phone]       Patient: Janet Frank   YOB: 1995  Date of Visit: 08/22/2024    To Whom It May Concern:    Ubaldo Frank  was at [facility] on 08/22/2024. The patient may return to work/school on 08/25/2024 with no restrictions. If you have any questions or concerns, or if I can be of further assistance, please do not hesitate to contact me.    Sincerely,    MICHELLE Rick     
Normal shape and contour; nares, nostrils and choana patent; no nasal flaring; mucosa pink and moist.

## 2024-08-22 NOTE — PROGRESS NOTES
Subjective:      Patient ID: Janet Frank is a 29 y.o. female.    Vitals:  vitals were not taken for this visit.     Chief Complaint: Headache      Visit Type: TELE AUDIOVISUAL    Present with the patient at the time of consultation: TELEMED PRESENT WITH PATIENT: None    Past Medical History:   Diagnosis Date    GERD (gastroesophageal reflux disease)     Headache      History reviewed. No pertinent surgical history.  Review of patient's allergies indicates:   Allergen Reactions    Benadryl allergy decongestant Hives    Diphenhydramine hcl Hives     Current Outpatient Medications on File Prior to Visit   Medication Sig Dispense Refill    butalbital-acetaminophen-caffeine -40 mg (FIORICET, ESGIC) -40 mg per tablet Take 1 tablet by mouth every 6 (six) hours as needed for Headaches. 12 tablet 0    ergocalciferol (ERGOCALCIFEROL) 50,000 unit Cap Take 1 capsule (50,000 Units total) by mouth every 7 days. 12 capsule 1    famotidine (PEPCID) 40 MG tablet Take 1 tablet (40 mg total) by mouth every evening. 30 tablet 6    fluticasone propionate (FLONASE) 50 mcg/actuation nasal spray 1 spray (50 mcg total) by Each Nostril route once daily. 9.9 mL 0     No current facility-administered medications on file prior to visit.     Family History   Problem Relation Name Age of Onset    Breast cancer Mother      No Known Problems Father      Cervical cancer Maternal Grandmother             Ohs Peq Odvv Intake    8/22/2024  1:54 PM CDT - Filed by Patient   What is your current physical address in the event of a medical emergency? [address]   Are you able to take your vital signs? No   Please attach any relevant images or files          In [place] via video conference in her home.     28 y/o woman with a 1 week history of severe headache in her frontal area and now in the occipital area of her neck. She has had subjective fever. No nausea or vomiting. No sore throat. No cough. No other symptoms. She took a COVID test  but isn't sure if the results are positive. She has been taking Fioricet without relief.     Headache   Associated symptoms include a fever.       Constitution: Positive for fever and generalized weakness.   HENT:  Negative for congestion.    Neurological:  Positive for headaches.        Objective:   The physical exam was conducted virtually.  Physical Exam   Constitutional:  Non-toxic appearance. No distress.   HENT:   Nose: No rhinorrhea or congestion.   Pulmonary/Chest: No stridor. No respiratory distress.         Comments: Normal verbal hansel without respiratory compromise.     Neurological: She is alert.       Assessment:     1. Acute intractable headache, unspecified headache type        Plan:       Acute intractable headache, unspecified headache type    With this headache occurring a week and not responding to Fioricet and with a possible positive COVID test, I recommend you go in and be seen in urgent care so they can test your for COVID as well as evaluate you for this headache that has been going on for a week now without relief.

## 2024-08-22 NOTE — PROGRESS NOTES
"Subjective:       Patient ID: Janet Frank is a 29 y.o. female.    Vitals:  height is 5' 3" (1.6 m) and weight is 110 kg (242 lb 9.6 oz). Her oral temperature is 97.8 °F (36.6 °C). Her blood pressure is 119/86 and her pulse is 89. Her respiration is 17 and oxygen saturation is 100%.     Chief Complaint: Headache (Constant HA radiates down to base of neck x1 week.)    30 y/o AAF presents to clinic with mattance' she reports generalized HA x 1 week which has not resolved with Firocet or PO Toradol. States she has a hx of childhood migraines.  Patient's last menstrual period was 08/12/2024 (approximate). Reports symptoms are interfering with work duties.       Headache   Associated symptoms include dizziness and photophobia. Pertinent negatives include no eye pain, fever, nausea or vomiting. Her past medical history is significant for migraine headaches.       Constitution: Negative for fever.   Eyes:  Positive for photophobia. Negative for eye discharge and eye pain.   Gastrointestinal:  Negative for nausea and vomiting.   Neurological:  Positive for dizziness, headaches and history of migraines. Negative for altered mental status.   Psychiatric/Behavioral:  Negative for altered mental status.        Objective:      Physical Exam   Constitutional: She is oriented to person, place, and time. She is cooperative. She is easily aroused.  Non-toxic appearance. She does not appear ill.      Comments:Appears uncomfortable , patient is sobbing.    overweightawake  HENT:   Head: Normocephalic and atraumatic.   Ears:   Right Ear: Tympanic membrane normal.   Left Ear: Tympanic membrane normal.   Eyes: Conjunctivae and lids are normal. Pupils are equal, round, and reactive to light. Right eye exhibits nystagmus. Left eye exhibits nystagmus.      Comments: Light sensitivity with examination    Neck: Neck supple.   Cardiovascular: Normal rate.   Pulmonary/Chest: Effort normal and breath sounds normal.   Musculoskeletal:      Right " lower leg: No edema.      Left lower leg: No edema.   Lymphadenopathy:     She has no cervical adenopathy.   Neurological: She is alert, oriented to person, place, and time and easily aroused. Gait and coordination normal. GCS eye subscore is 4. GCS verbal subscore is 5. GCS motor subscore is 6.   Skin: Skin is warm, dry, intact and not diaphoretic. Capillary refill takes less than 2 seconds.   Psychiatric: Her speech is normal and behavior is normal. Her affect is tearful.   Nursing note and vitals reviewed.        Assessment:       1. Acute intractable tension-type headache          Plan:     Encouraged patient to decrease modifiable stressors, limit you stimuli, monitor symptoms. If symptoms worsen to report to nearest ER. Follow up with RAYMOND Rajan on Monday.     Acute intractable tension-type headache  -     ketorolac injection 30 mg  -     dexAMETHasone injection 10 mg  -     metoclopramide injection 10 mg  -     metoclopramide HCl (REGLAN) 10 MG tablet; Take 1 tablet (10 mg total) by mouth 3 (three) times daily as needed (HA).  Dispense: 15 tablet; Refill: 0

## 2024-08-25 ENCOUNTER — HOSPITAL ENCOUNTER (EMERGENCY)
Facility: HOSPITAL | Age: 29
Discharge: HOME | End: 2024-08-25
Attending: STUDENT IN AN ORGANIZED HEALTH CARE EDUCATION/TRAINING PROGRAM
Payer: MEDICAID

## 2024-08-25 ENCOUNTER — ON-DEMAND VIRTUAL (OUTPATIENT)
Dept: URGENT CARE | Facility: CLINIC | Age: 29
End: 2024-08-25
Payer: MEDICAID

## 2024-08-25 VITALS
OXYGEN SATURATION: 100 % | HEART RATE: 76 BPM | DIASTOLIC BLOOD PRESSURE: 76 MMHG | WEIGHT: 243 LBS | TEMPERATURE: 98 F | HEIGHT: 63 IN | SYSTOLIC BLOOD PRESSURE: 136 MMHG | RESPIRATION RATE: 18 BRPM | BODY MASS INDEX: 43.05 KG/M2

## 2024-08-25 DIAGNOSIS — G43.809 OTHER MIGRAINE WITHOUT STATUS MIGRAINOSUS, NOT INTRACTABLE: Primary | ICD-10-CM

## 2024-08-25 DIAGNOSIS — R51.9 NONINTRACTABLE HEADACHE, UNSPECIFIED CHRONICITY PATTERN, UNSPECIFIED HEADACHE TYPE: Primary | ICD-10-CM

## 2024-08-25 LAB
B-HCG UR QL: NEGATIVE
CTP QC/QA: YES
FLUAV AG UPPER RESP QL IA.RAPID: NOT DETECTED
FLUBV AG UPPER RESP QL IA.RAPID: NOT DETECTED
RSV A 5' UTR RNA NPH QL NAA+PROBE: NOT DETECTED
SARS-COV-2 RNA RESP QL NAA+PROBE: NOT DETECTED

## 2024-08-25 PROCEDURE — 0241U COVID/RSV/FLU A&B PCR: CPT | Performed by: STUDENT IN AN ORGANIZED HEALTH CARE EDUCATION/TRAINING PROGRAM

## 2024-08-25 PROCEDURE — 96374 THER/PROPH/DIAG INJ IV PUSH: CPT

## 2024-08-25 PROCEDURE — 99284 EMERGENCY DEPT VISIT MOD MDM: CPT | Mod: 25

## 2024-08-25 PROCEDURE — 81025 URINE PREGNANCY TEST: CPT | Performed by: EMERGENCY MEDICINE

## 2024-08-25 PROCEDURE — 96361 HYDRATE IV INFUSION ADD-ON: CPT

## 2024-08-25 PROCEDURE — 63600175 PHARM REV CODE 636 W HCPCS: Performed by: STUDENT IN AN ORGANIZED HEALTH CARE EDUCATION/TRAINING PROGRAM

## 2024-08-25 PROCEDURE — 99213 OFFICE O/P EST LOW 20 MIN: CPT | Mod: 95,,, | Performed by: FAMILY MEDICINE

## 2024-08-25 RX ORDER — PROCHLORPERAZINE EDISYLATE 5 MG/ML
10 INJECTION INTRAMUSCULAR; INTRAVENOUS
Status: COMPLETED | OUTPATIENT
Start: 2024-08-25 | End: 2024-08-25

## 2024-08-25 RX ADMIN — SODIUM CHLORIDE, POTASSIUM CHLORIDE, SODIUM LACTATE AND CALCIUM CHLORIDE 1000 ML: 600; 310; 30; 20 INJECTION, SOLUTION INTRAVENOUS at 08:08

## 2024-08-25 RX ADMIN — PROCHLORPERAZINE EDISYLATE 10 MG: 5 INJECTION INTRAMUSCULAR; INTRAVENOUS at 08:08

## 2024-08-25 NOTE — DISCHARGE INSTRUCTIONS

## 2024-08-25 NOTE — PROGRESS NOTES
Subjective:      Patient ID: Janet Frank is a 29 y.o. female.    Vitals:  vitals were not taken for this visit.     Chief Complaint: Headache      Visit Type: TELE AUDIOVISUAL    Present with the patient at the time of consultation: TELEMED PRESENT WITH PATIENT: None    Past Medical History:   Diagnosis Date    GERD (gastroesophageal reflux disease)     Headache      History reviewed. No pertinent surgical history.  Review of patient's allergies indicates:   Allergen Reactions    Benadryl allergy decongestant Hives    Diphenhydramine hcl Hives     Current Outpatient Medications on File Prior to Visit   Medication Sig Dispense Refill    butalbital-acetaminophen-caffeine -40 mg (FIORICET, ESGIC) -40 mg per tablet Take 1 tablet by mouth every 6 (six) hours as needed for Headaches. 12 tablet 0    ergocalciferol (ERGOCALCIFEROL) 50,000 unit Cap Take 1 capsule (50,000 Units total) by mouth every 7 days. 12 capsule 1    famotidine (PEPCID) 40 MG tablet Take 1 tablet (40 mg total) by mouth every evening. 30 tablet 6    fluticasone propionate (FLONASE) 50 mcg/actuation nasal spray 1 spray (50 mcg total) by Each Nostril route once daily. (Patient not taking: Reported on 8/22/2024) 9.9 mL 0    metoclopramide HCl (REGLAN) 10 MG tablet Take 1 tablet (10 mg total) by mouth 3 (three) times daily as needed (HA). 15 tablet 0     No current facility-administered medications on file prior to visit.     Family History   Problem Relation Name Age of Onset    Breast cancer Mother      No Known Problems Father      Cervical cancer Maternal Grandmother             Ohs Peq Odvv Intake    8/25/2024  6:51 AM CDT - Filed by Patient   What is your current physical address in the event of a medical emergency? [address]   Are you able to take your vital signs? No   Please attach any relevant images or files          Patient is at work at [employer] in [place] [address] [address] [facility]  Leaving work.   Valdemarcheryl picked her up  during the visit.    28 yo with c/o of headache for 4 days. Seen in urgent care 8/22 and given dexamethasone injection and Toradol injection at that time.  She reports that headache has continued.  No reported head trauma.  She also has Fioricet at home and ketorolac at home but reports that both these medicines make her dizzy.  Describes 8/10 pain.            Constitution: Negative for fever.        Objective:   The physical exam was conducted virtually.  Physical Exam   Constitutional: She is oriented to person, place, and time. She appears well-developed.  Non-toxic appearance. She does not appear ill.      Comments:Appears fatigued and uncomfortable.  No slurred speech.     HENT:   Head: Normocephalic and atraumatic.   Ears:   Right Ear: External ear normal.   Left Ear: External ear normal.   Pulmonary/Chest: Effort normal. No stridor. No respiratory distress.   Neurological: She is alert and oriented to person, place, and time. Gait normal.   Psychiatric: Her behavior is normal. Thought content normal.   Nursing note and vitals reviewed.      Assessment:     1. Nonintractable headache, unspecified chronicity pattern, unspecified headache type        Plan:       Nonintractable headache, unspecified chronicity pattern, unspecified headache type      AS WE DISCUSSED, DUE TO YOUR UNCONTROLLED AND PROLONGED PAIN, IN ADDITION TO YOUR INTOLERANCE OF THE PRESCRIBED MEDICATIONS, I AM RECOMMENDING THAT YOU BE SEEN IN PERSON IN THE EMERGENCY ROOM FOR FURTHER EVALUATION AND TREATMENT.

## 2024-08-25 NOTE — PATIENT INSTRUCTIONS
AS WE DISCUSSED, DUE TO YOUR UNCONTROLLED AND PROLONGED PAIN, IN ADDITION TO YOUR INTOLERANCE OF THE PRESCRIBED MEDICATIONS, I AM RECOMMENDING THAT YOU BE SEEN IN PERSON IN THE EMERGENCY ROOM FOR FURTHER EVALUATION AND TREATMENT.

## 2024-08-25 NOTE — ED NOTES
Patient refused discharge paperwork, stating she is ready to go and claustrophobic in the hospital bed/room. Pt walked out of room after IV removed and went to the lobby.

## 2024-08-25 NOTE — Clinical Note
"Janet Frank (Falisia) was seen and treated in our emergency department on 8/25/2024.  She may return to work on 08/27/2024.       If you have any questions or concerns, please don't hesitate to call.      Zac Toussaint MD"

## 2024-08-25 NOTE — ED PROVIDER NOTES
Encounter Date: 2024    SCRIBE #1 NOTE: I, Lisa Ann, am scribing for, and in the presence of,  Zac Toussaint MD. I have scribed the following portions of the note - Other sections scribed: HPI, ROS, PE.       History     Chief Complaint   Patient presents with    Headache     Patient reports migraine x 3 days. Seen at [facility] on Thursday-no relief with prescribed meds. Also reports pain when turning neck. Denies cough, congestion, nausea, vomiting.      Patient is a 30 y/o female with a PMHx of GERD and chronic headaches presents to the ED for constant headaches beginning 3 days ago. She reports going to [facility] on  for her symptoms and was prescribed toradol and fioricet. She reports she finds no relief with prescribed medication. She reports she does not take her Fioricet medication because it makes her dizzy. She reports having a virtual urgent care consult today that referred her to present to the ED. She denies having any recent trauma. She reports photophobia. She denies weakness, numbness, tingling, fever, chills, nausea, or vomiting. She reports neck stiffness. She reports having a neurologist when she was first seen for her headaches in . She reports she has not seen him in a decade.     The history is provided by the patient and medical records. No  was used.     Review of patient's allergies indicates:   Allergen Reactions    Benadryl allergy decongestant Hives    Diphenhydramine hcl Hives     Past Medical History:   Diagnosis Date    GERD (gastroesophageal reflux disease)     Headache      No past surgical history on file.  Family History   Problem Relation Name Age of Onset    Breast cancer Mother      No Known Problems Father      Cervical cancer Maternal Grandmother       Social History     Tobacco Use    Smoking status: Former     Types: Vaping w/o nicotine     Start date: 2022     Quit date: 2023     Years since quittin.7    Smokeless tobacco: Never    Substance Use Topics    Alcohol use: Yes     Comment: occassionally    Drug use: Never     Review of Systems   Constitutional:  Positive for diaphoresis. Negative for chills and fever.   Eyes:  Positive for photophobia.   Gastrointestinal:  Negative for nausea and vomiting.   Neurological:  Positive for headaches. Negative for weakness and numbness.       Physical Exam     Initial Vitals [08/25/24 0744]   BP Pulse Resp Temp SpO2   133/83 71 18 97.8 °F (36.6 °C) 100 %      MAP       --         Physical Exam    Nursing note and vitals reviewed.  Constitutional: She appears well-developed and well-nourished. She is not diaphoretic. No distress.   HENT:   Head: Normocephalic and atraumatic.   Right Ear: External ear normal.   Left Ear: External ear normal.   Nose: Nose normal.   Eyes: Conjunctivae and EOM are normal. Pupils are equal, round, and reactive to light. Right eye exhibits no discharge. Left eye exhibits no discharge.   Neck: Neck supple.   Full range of motion no discomfort.  No neck stiffness exhibited.  Negative Brudzinski.   Normal range of motion.  Cardiovascular:  Normal rate, regular rhythm, normal heart sounds and intact distal pulses.     Exam reveals no gallop and no friction rub.       No murmur heard.  Pulmonary/Chest: Breath sounds normal. No respiratory distress. She has no wheezes. She has no rhonchi. She has no rales. She exhibits no tenderness.   Abdominal: Abdomen is soft. Bowel sounds are normal. She exhibits no distension and no mass. There is no abdominal tenderness. There is no rebound and no guarding.   Musculoskeletal:         General: No edema. Normal range of motion.      Cervical back: Normal range of motion and neck supple.     Neurological: She is alert and oriented to person, place, and time. No cranial nerve deficit or sensory deficit.   No focal neural deficits.   Skin: Skin is warm and dry. Capillary refill takes less than 2 seconds. No erythema. No pallor.         ED Course    Procedures  Labs Reviewed   COVID/RSV/FLU A&B PCR - Normal       Result Value    Influenza A PCR Not Detected      Influenza B PCR Not Detected      Respiratory Syncytial Virus PCR Not Detected      SARS-CoV-2 PCR Not Detected      Narrative:     The Xpert Xpress SARS-CoV-2/FLU/RSV plus is a rapid, multiplexed real-time PCR test intended for the simultaneous qualitative detection and differentiation of SARS-CoV-2, Influenza A, Influenza B, and respiratory syncytial virus (RSV) viral RNA in either nasopharyngeal swab or nasal swab specimens.         POCT URINE PREGNANCY    POC Preg Test, Ur Negative       Acceptable Yes            Imaging Results    None          Medications   prochlorperazine injection Soln 10 mg (10 mg Intravenous Given 8/25/24 0835)   lactated ringers bolus 1,000 mL (0 mLs Intravenous Stopped 8/25/24 0935)     Medical Decision Making  The differential diagnosis includes but is not limited to, Headache, migraine, tension headache, chronic migraine, idiopathic intracranial hypertension    Please see ED course for MDM.    Amount and/or Complexity of Data Reviewed  External Data Reviewed: notes.     Details: See ED course  Labs: ordered. Decision-making details documented in ED Course.    Risk  OTC drugs.  Prescription drug management.            Scribe Attestation:   Scribe #1: I performed the above scribed service and the documentation accurately describes the services I performed. I attest to the accuracy of the note.    Attending Attestation:           Physician Attestation for Scribe:  Physician Attestation Statement for Scribe #1: I, Zac Toussaint MD, reviewed documentation, as scribed by Lisa Ann in my presence, and it is both accurate and complete.             ED Course as of 08/25/24 1451   Sun Aug 25, 2024   0805 Chart review reveals that patient was seen 08/08/2024 at outside hospital for headache.  Described Fioricet and Toradol at that time.  She was seen via tele  urgent care 08/22/2024 for headache.  Seen again this a.m. via tele urgent care for same.  And encouraged to come in the emergency department for further evaluation. [MM]   0919 Influenza A, Molecular: Not Detected [MM]   0919 Influenza B, Molecular: Not Detected [MM]   0919 RSV Ag by Molecular Method: Not Detected [MM]   0919 SARS-CoV2 (COVID-19) Qualitative PCR: Not Detected [MM]   0919 hCG Qualitative, Urine: Negative [MM]   1004 On re-evaluation patient was awake and alert.  GCS 15.  On re-evaluation she was ambulatory and requesting discharge.  Says she felt claustrophobic in the room.  Offered her referral to Neurology however, She reports that she was follow up with her neurologist tomorrow.  She reports her headache has resolved after Compazine here in the emergency department.  I believe this time she was suitable for discharge home.  She was no signs or symptoms of meningitis.  No stigmata of infection.  No neck stiffness no fever no chills.  History of migraines. Return precautions given.  Questions invited, questions answered to the best my ability.  Patient discharged home condition stable.   [MM]      ED Course User Index  [MM] Zac Toussaint MD                           Clinical Impression:  Final diagnoses:  [G43.809] Other migraine without status migrainosus, not intractable (Primary)          ED Disposition Condition    Discharge Stable          ED Prescriptions    None       Follow-up Information       Follow up With Specialties Details Why Contact Info    Mohini Sibley FNP Family Medicine Call   [address] [facility] [id number] [phone]               Zac Toussaint MD  08/25/24 7917

## 2024-08-26 ENCOUNTER — PATIENT MESSAGE (OUTPATIENT)
Dept: INTERNAL MEDICINE | Facility: CLINIC | Age: 29
End: 2024-08-26
Payer: MEDICAID

## 2024-08-28 ENCOUNTER — ON-DEMAND VIRTUAL (OUTPATIENT)
Dept: URGENT CARE | Facility: CLINIC | Age: 29
End: 2024-08-28
Payer: MEDICAID

## 2024-08-28 DIAGNOSIS — Z02.89 ENCOUNTER TO OBTAIN EXCUSE FROM WORK: ICD-10-CM

## 2024-08-28 DIAGNOSIS — G43.909 MIGRAINE WITHOUT STATUS MIGRAINOSUS, NOT INTRACTABLE, UNSPECIFIED MIGRAINE TYPE: Primary | ICD-10-CM

## 2024-08-28 NOTE — LETTER
August 28, 2024    Janet Frank  [address] [address]             Virtual Visit - Urgent Care  Urgent Care  [address] [address]   August 28, 2024     Patient: Janet Frank   YOB: 1995   Date of Visit: 8/28/2024       To Whom it May Concern:    Janet Frank was seen virtually on 8/28/2024. Further time off is needed. She may return to work once medically cleared by Neurology. Follow-up pending.    Please excuse her from any classes or work missed.    If you have any questions or concerns, please don't hesitate to call.    Sincerely,         Maile Preciado, NP

## 2024-08-28 NOTE — PROGRESS NOTES
Subjective:      Patient ID: Janet Frank is a 29 y.o. female.    Vitals:  vitals were not taken for this visit.     Chief Complaint: Headache      Visit Type: TELE AUDIOVISUAL    Present with the patient at the time of consultation: TELEMED PRESENT WITH PATIENT: None, at home    Past Medical History:   Diagnosis Date    GERD (gastroesophageal reflux disease)     Headache      History reviewed. No pertinent surgical history.  Review of patient's allergies indicates:   Allergen Reactions    Benadryl allergy decongestant Hives    Diphenhydramine hcl Hives     Current Outpatient Medications on File Prior to Visit   Medication Sig Dispense Refill    butalbital-acetaminophen-caffeine -40 mg (FIORICET, ESGIC) -40 mg per tablet Take 1 tablet by mouth every 6 (six) hours as needed for Headaches. 12 tablet 0    ergocalciferol (ERGOCALCIFEROL) 50,000 unit Cap Take 1 capsule (50,000 Units total) by mouth every 7 days. 12 capsule 1    famotidine (PEPCID) 40 MG tablet Take 1 tablet (40 mg total) by mouth every evening. 30 tablet 6    fluticasone propionate (FLONASE) 50 mcg/actuation nasal spray 1 spray (50 mcg total) by Each Nostril route once daily. (Patient not taking: Reported on 2024) 9.9 mL 0    [] metoclopramide HCl (REGLAN) 10 MG tablet Take 1 tablet (10 mg total) by mouth 3 (three) times daily as needed (HA). 15 tablet 0     No current facility-administered medications on file prior to visit.     Family History   Problem Relation Name Age of Onset    Breast cancer Mother      No Known Problems Father      Cervical cancer Maternal Grandmother             Ohs Peq Odvv Intake    2024  5:04 PM CDT - Filed by Patient   What is your current physical address in the event of a medical emergency? [address]   Are you able to take your vital signs? No   Please attach any relevant images or files          Hx of migraines. Seen  in ED. Meds helping somewhat, but pain persists. Has Neurology  follow-up on Friday. Unable to work. Needs a work excuse at this time.    Headache   Associated symptoms include eye pain. Pertinent negatives include no dizziness or weakness. Her past medical history is significant for migraine headaches.       Eyes:  Positive for eye pain.   Neurological:  Positive for headaches and history of migraines. Negative for dizziness, light-headedness, passing out, facial drooping, speech difficulty, coordination disturbances and altered mental status.   Psychiatric/Behavioral:  Negative for altered mental status.         Objective:   The physical exam was conducted virtually.  Physical Exam   Constitutional: She is oriented to person, place, and time. She does not appear ill. No distress.   HENT:   Head: Normocephalic and atraumatic.   Nose: Nose normal.   Eyes: Extraocular movement intact   Pulmonary/Chest: Effort normal.   Abdominal: Normal appearance.   Musculoskeletal: Normal range of motion.         General: Normal range of motion.   Neurological: no focal deficit. She is alert and oriented to person, place, and time.   Psychiatric: Her behavior is normal. Mood normal.   Vitals reviewed.      Assessment:     1. Migraine without status migrainosus, not intractable, unspecified migraine type    2. Encounter to obtain excuse from work        Plan:   Follow-up as scheduled.    Patient encouraged to monitor symptoms closely and instructed to follow-up for new or worsening symptoms. Further, in-person, evaluation may be necessary for continued treatment. Please follow up with your primary care doctor or specialist as needed. Verbally discussed plan. Patient confirms understanding and is in agreement with treatment and plan.     You must understand that you've received a Virtual Care evaluation only and that you may be released before all your medical problems are known or treated. You, the patient, will arrange for follow up care as instructed.      Migraine without status migrainosus,  not intractable, unspecified migraine type    Encounter to obtain excuse from work        Patient Instructions   Patient Education       Migraines Discharge Instructions   About this topic   A migraine is a specific type of headache. All headaches are not migraines. A migraine is caused by abnormal brain activity. It may be due to widening or narrowing of the blood vessels in the head. It may last for a couple of hours or even a few days.  There are two types of migraine headaches:  Classic migraine or migraine with aura ? It often starts with some problems in your eyesight called auras. You may see spots, dots, or even zigzag lines. Some people will lose part of their vision. An aura is the signal that a migraine is coming. It is followed by a very bad headache on one side of the head. Noise, light, and other activities can make it worse. Sometimes, it comes with upset stomach and throwing up.  Common migraine or migraine without aura ? This migraine happens without the signal that the headache is coming.  Migraines can be treated with different drugs. Some drugs treat the pain. Other drugs stop the brain activity that causes the migraine. If migraines happen often, drugs that prevent migraines can help. Migraines can be helped by sleep. Stress control like exercise, relaxation, and a regular routine of good nutrition and sleep are all helpful in preventing migraines. Lifestyle and dietary changes may also help you deal with a migraine.         What care is needed at home?   Ask your doctor what you need to do when you go home. Make sure you ask questions if you do not understand what the doctor says. This way you will know what you need to do.  Your doctor may give you drugs to help with the pain. Take them as ordered by your doctor.  Your doctor may teach you some ways to help control your migraine like relaxation and exercises.  Keep a diary about your headaches. Write down when your headache happens. Write down  what you were doing before it happened. Write down any foods or drinks that you had in the last day. This will help you learn what might be causing your headaches. Then, you can learn how to avoid them.  Place an ice pack or a bag of frozen peas wrapped in a towel over your head. Never put ice right on the skin. Do not leave the ice on more than 10 to 15 minutes at a time.  Using heat may also help. Try using a heating pad on the back of your neck. A warm shower or bath may also relax tight muscles.  Avoid bright lights and noise. Rest in a quiet, dark room. Sleep may also help.  Drink a caffeinated beverage. Be careful to not drink too much caffeine as this can cause other headaches.  Do not make any big decisions until your migraine goes away.  If certain drugs your doctor ordered trigger your migraine, your doctor may tell you to stop taking those drugs.  What follow-up care is needed?   Your doctor may ask you to make visits to the office to check on your progress. Be sure to keep these visits.  Your doctor may send you to a specialist called a neurologist.  Your doctor may request that you get a brain MRI.  What drugs may be needed?   The doctor may order drugs to:  Help with pain  Relieve the migraine  Prevent a migraine attack  Treat an upset stomach and throwing up  Treat a hormonal problem  Will physical activity be limited?   Regular exercise can help prevent migraines. If exercise triggers your migraine, talk to your doctor.  Do not drive or run machinery until your migraine goes away.  What changes to diet are needed?   Do not skip or delay meals. Drink 6 to 8 glasses of water each day. This may help prevent migraines.  Avoid foods that may trigger the attack. These may include processed, fermented, pickled, or marinated foods. Some of these are baked goods, chocolate, dairy products, nuts, onions, and peanut butter. Others are fruits like avocado, banana, or citrus fruit and meats like payne, hot dogs,  and cured meats.  Limit caffeine intake. You will find that caffeine may help relieve pain. But, too much caffeine may also trigger an attack.  Avoid drinking beer, wine, and mixed drinks (alcohol) if you think this is causing your migraines.  Quit smoking. Smoking can worsen your migraine.  What problems could happen?   Loss of work time or missing school if migraines come often  Low mood, worry  Poor quality of life  Migraines can slightly raise your chances of having a stroke   What can be done to prevent this health problem?   Some drugs may help prevent migraines. Talk to your doctor about the drugs you may need to take.  Keep a sleeping routine. Go to sleep and get up the same time every day.  Be active. Exercise can reduce your risk of having migraines.  Take note of the things that may trigger your migraine. Learning what they are is very important to help avoid an attack.  Try to keep stress in your life low. Try relaxation exercises or meditation to lower stress.  When do I need to call the doctor?   Signs of a bad reaction. These include very bad headache beyond the usual; speech, vision, motion problems or loss of balance; headaches are worse when lying down or headaches suddenly starts. Call for emergency help right away.  Changes in migraine attacks  Migraines that happen more often than 3 times a month  You are not feeling better in 2 to 3 days or you are feeling worse  Teach Back: Helping You Understand   The Teach Back Method helps you understand the information we are giving you. After you talk with the staff, tell them in your own words what you learned. This helps to make sure the staff has described each thing clearly. It also helps to explain things that may have been confusing. Before going home, make sure you can do these:  I can tell you about my condition.  I can tell you what may help ease my pain.  I can tell you what I will do if there is a change in my headaches.  Where can I learn  more?   American Academy of Pediatrics  https://www.healthychildren.org/English/health-issues/conditions/head-neck-nervous-system/Pages/Migraine-Headaches-in-Children.aspx   NHS Choices  http://www.nhs.uk/Conditions/Migraine/Pages/Causes.aspx   Last Reviewed Date   2020-05-12  Consumer Information Use and Disclaimer   This information is not specific medical advice and does not replace information you receive from your health care provider. This is only a brief summary of general information. It does NOT include all information about conditions, illnesses, injuries, tests, procedures, treatments, therapies, discharge instructions or life-style choices that may apply to you. You must talk with your health care provider for complete information about your health and treatment options. This information should not be used to decide whether or not to accept your health care providers advice, instructions or recommendations. Only your health care provider has the knowledge and training to provide advice that is right for you.  Copyright   Copyright © 2021 [employer] and its affiliates and/or licensors. All rights reserved.

## 2024-08-29 NOTE — PATIENT INSTRUCTIONS
Patient Education       Migraines Discharge Instructions   About this topic   A migraine is a specific type of headache. All headaches are not migraines. A migraine is caused by abnormal brain activity. It may be due to widening or narrowing of the blood vessels in the head. It may last for a couple of hours or even a few days.  There are two types of migraine headaches:  Classic migraine or migraine with aura ? It often starts with some problems in your eyesight called auras. You may see spots, dots, or even zigzag lines. Some people will lose part of their vision. An aura is the signal that a migraine is coming. It is followed by a very bad headache on one side of the head. Noise, light, and other activities can make it worse. Sometimes, it comes with upset stomach and throwing up.  Common migraine or migraine without aura ? This migraine happens without the signal that the headache is coming.  Migraines can be treated with different drugs. Some drugs treat the pain. Other drugs stop the brain activity that causes the migraine. If migraines happen often, drugs that prevent migraines can help. Migraines can be helped by sleep. Stress control like exercise, relaxation, and a regular routine of good nutrition and sleep are all helpful in preventing migraines. Lifestyle and dietary changes may also help you deal with a migraine.         What care is needed at home?   Ask your doctor what you need to do when you go home. Make sure you ask questions if you do not understand what the doctor says. This way you will know what you need to do.  Your doctor may give you drugs to help with the pain. Take them as ordered by your doctor.  Your doctor may teach you some ways to help control your migraine like relaxation and exercises.  Keep a diary about your headaches. Write down when your headache happens. Write down what you were doing before it happened. Write down any foods or drinks that you had in the last day. This will  help you learn what might be causing your headaches. Then, you can learn how to avoid them.  Place an ice pack or a bag of frozen peas wrapped in a towel over your head. Never put ice right on the skin. Do not leave the ice on more than 10 to 15 minutes at a time.  Using heat may also help. Try using a heating pad on the back of your neck. A warm shower or bath may also relax tight muscles.  Avoid bright lights and noise. Rest in a quiet, dark room. Sleep may also help.  Drink a caffeinated beverage. Be careful to not drink too much caffeine as this can cause other headaches.  Do not make any big decisions until your migraine goes away.  If certain drugs your doctor ordered trigger your migraine, your doctor may tell you to stop taking those drugs.  What follow-up care is needed?   Your doctor may ask you to make visits to the office to check on your progress. Be sure to keep these visits.  Your doctor may send you to a specialist called a neurologist.  Your doctor may request that you get a brain MRI.  What drugs may be needed?   The doctor may order drugs to:  Help with pain  Relieve the migraine  Prevent a migraine attack  Treat an upset stomach and throwing up  Treat a hormonal problem  Will physical activity be limited?   Regular exercise can help prevent migraines. If exercise triggers your migraine, talk to your doctor.  Do not drive or run machinery until your migraine goes away.  What changes to diet are needed?   Do not skip or delay meals. Drink 6 to 8 glasses of water each day. This may help prevent migraines.  Avoid foods that may trigger the attack. These may include processed, fermented, pickled, or marinated foods. Some of these are baked goods, chocolate, dairy products, nuts, onions, and peanut butter. Others are fruits like avocado, banana, or citrus fruit and meats like payne, hot dogs, and cured meats.  Limit caffeine intake. You will find that caffeine may help relieve pain. But, too much  caffeine may also trigger an attack.  Avoid drinking beer, wine, and mixed drinks (alcohol) if you think this is causing your migraines.  Quit smoking. Smoking can worsen your migraine.  What problems could happen?   Loss of work time or missing school if migraines come often  Low mood, worry  Poor quality of life  Migraines can slightly raise your chances of having a stroke   What can be done to prevent this health problem?   Some drugs may help prevent migraines. Talk to your doctor about the drugs you may need to take.  Keep a sleeping routine. Go to sleep and get up the same time every day.  Be active. Exercise can reduce your risk of having migraines.  Take note of the things that may trigger your migraine. Learning what they are is very important to help avoid an attack.  Try to keep stress in your life low. Try relaxation exercises or meditation to lower stress.  When do I need to call the doctor?   Signs of a bad reaction. These include very bad headache beyond the usual; speech, vision, motion problems or loss of balance; headaches are worse when lying down or headaches suddenly starts. Call for emergency help right away.  Changes in migraine attacks  Migraines that happen more often than 3 times a month  You are not feeling better in 2 to 3 days or you are feeling worse  Teach Back: Helping You Understand   The Teach Back Method helps you understand the information we are giving you. After you talk with the staff, tell them in your own words what you learned. This helps to make sure the staff has described each thing clearly. It also helps to explain things that may have been confusing. Before going home, make sure you can do these:  I can tell you about my condition.  I can tell you what may help ease my pain.  I can tell you what I will do if there is a change in my headaches.  Where can I learn more?   American Academy of  Pediatrics  https://www.healthychildren.org/English/health-issues/conditions/head-neck-nervous-system/Pages/Migraine-Headaches-in-Children.aspx   NHS Choices  http://www.nhs.uk/Conditions/Migraine/Pages/Causes.aspx   Last Reviewed Date   2020-05-12  Consumer Information Use and Disclaimer   This information is not specific medical advice and does not replace information you receive from your health care provider. This is only a brief summary of general information. It does NOT include all information about conditions, illnesses, injuries, tests, procedures, treatments, therapies, discharge instructions or life-style choices that may apply to you. You must talk with your health care provider for complete information about your health and treatment options. This information should not be used to decide whether or not to accept your health care providers advice, instructions or recommendations. Only your health care provider has the knowledge and training to provide advice that is right for you.  Copyright   Copyright © 2021 [employer] and its affiliates and/or licensors. All rights reserved.

## 2024-11-07 NOTE — PROGRESS NOTES
MICHELLE Gonzalez   [facility]  [address] [facility] [address]      PATIENT NAME: Janet Frank  : 1995  DATE: 24  MRN: [id number]      Patient PCP Information       None on File            Reason for Visit / Chief Complaint: Headache (H/A off/on today 6/10, fasting, refused vaccines)       History of Present Illness / Problem Focused Workflow     Janet Frank presents to the clinic with Headache (H/A off/on today 6/10, fasting, refused vaccines)     28 yo  female presents to clinic. Medical problems include chronic headaches, GERD (prev referred to gastro), vit D deficiency, and obesity    24  Pt presents for routine follow up. She reports headaches 4-5 times per week and frequency and intensity is worsening. She reports no medication helps. Fioricet is not helping, she does take toradol which provides mild relief at times. Will obtain MRI and refer to neurology once completed. She is requesting bariatric referral today for weight loss. She is due for wellness, is fasting today and will complete labs and RTC in a few weeks. Declines vaccines.           Review of Systems     Review of Systems   Constitutional:  Negative for fatigue, fever and unexpected weight change.   HENT:  Negative for ear pain, hearing loss, trouble swallowing and voice change.         Headaches   Respiratory:  Negative for cough and shortness of breath.    Cardiovascular:  Negative for chest pain and palpitations.   Gastrointestinal:  Negative for abdominal pain, diarrhea and vomiting.   Genitourinary:  Negative for dysuria.   Musculoskeletal:  Negative for gait problem.   Skin:  Negative for rash and wound.   Neurological:  Negative for weakness.   Psychiatric/Behavioral:  Negative for suicidal ideas.          Medications and Allergies     Medications  Current Outpatient Medications   Medication Instructions     "butalbital-acetaminophen-caffeine -40 mg (FIORICET, ESGIC) -40 mg per tablet 1 tablet, Oral, Every 6 hours PRN    ergocalciferol (ERGOCALCIFEROL) 50,000 Units, Oral, Every 7 days    famotidine (PEPCID) 40 mg, Oral, Nightly    fluticasone propionate (FLONASE) 50 mcg, Each Nostril, Daily    ketorolac (TORADOL) 10 mg, Oral, Every 6 hours         Allergies  Review of patient's allergies indicates:   Allergen Reactions    Benadryl allergy decongestant Hives    Diphenhydramine hcl Hives       Physical Examination     Visit Vitals  /77 (BP Location: Left arm, Patient Position: Sitting)   Pulse 78   Temp 97.8 °F (36.6 °C) (Oral)   Resp 20   Ht 5' 2.99" (1.6 m)   Wt 112.9 kg (248 lb 12.8 oz)   LMP 10/14/2024 (Exact Date)   BMI 44.08 kg/m²       Physical Exam  Vitals and nursing note reviewed.   Constitutional:       General: She is not in acute distress.     Appearance: She is not ill-appearing.   HENT:      Head: Normocephalic and atraumatic.      Mouth/Throat:      Mouth: Mucous membranes are moist.      Pharynx: Oropharynx is clear.   Eyes:      General: No scleral icterus.     Extraocular Movements: Extraocular movements intact.      Conjunctiva/sclera: Conjunctivae normal.      Pupils: Pupils are equal, round, and reactive to light.   Neck:      Vascular: No carotid bruit.   Cardiovascular:      Rate and Rhythm: Normal rate and regular rhythm.      Heart sounds: No murmur heard.     No friction rub. No gallop.   Pulmonary:      Effort: Pulmonary effort is normal. No respiratory distress.      Breath sounds: Normal breath sounds. No wheezing, rhonchi or rales.   Abdominal:      General: Abdomen is flat. Bowel sounds are normal. There is no distension.      Palpations: Abdomen is soft. There is no mass.      Tenderness: There is no abdominal tenderness.   Musculoskeletal:         General: Normal range of motion.      Cervical back: Normal range of motion and neck supple.   Skin:     General: Skin is warm " and dry.   Neurological:      General: No focal deficit present.      Mental Status: She is alert.   Psychiatric:         Mood and Affect: Mood normal.           Results     Lab Results   Component Value Date    WBC 9.41 11/28/2023    RBC 4.79 11/28/2023    HGB 12.5 11/28/2023    HCT 38.7 11/28/2023    MCV 80.8 11/28/2023    MCH 26.1 (L) 11/28/2023    MCHC 32.3 (L) 11/28/2023    RDW 14.8 11/28/2023     11/28/2023    MPV 10.8 (H) 11/28/2023     CMP  Sodium   Date Value Ref Range Status   11/28/2023 139 136 - 145 mmol/L Final     Potassium   Date Value Ref Range Status   11/28/2023 4.1 3.5 - 5.1 mmol/L Final     Chloride   Date Value Ref Range Status   11/28/2023 106 98 - 107 mmol/L Final     CO2   Date Value Ref Range Status   11/28/2023 26 22 - 29 mmol/L Final     Blood Urea Nitrogen   Date Value Ref Range Status   11/28/2023 10.4 7.0 - 18.7 mg/dL Final     Creatinine   Date Value Ref Range Status   11/28/2023 0.79 0.55 - 1.02 mg/dL Final     Calcium   Date Value Ref Range Status   11/28/2023 9.0 8.4 - 10.2 mg/dL Final     Albumin   Date Value Ref Range Status   11/28/2023 3.9 3.5 - 5.0 g/dL Final     Bilirubin Total   Date Value Ref Range Status   11/28/2023 0.4 <=1.5 mg/dL Final     ALP   Date Value Ref Range Status   11/28/2023 61 40 - 150 unit/L Final     AST   Date Value Ref Range Status   11/28/2023 17 5 - 34 unit/L Final     ALT   Date Value Ref Range Status   11/28/2023 16 0 - 55 unit/L Final     Estimated GFR-Non    Date Value Ref Range Status   10/05/2020 >60  Final     Lab Results   Component Value Date    CHOL 182 04/24/2023     Lab Results   Component Value Date    HDL 33 (L) 04/24/2023     Lab Results   Component Value Date    TRIG 128 04/24/2023     Lab Results   Component Value Date    VLDL 26 04/24/2023     Lab Results   Component Value Date    .00 04/24/2023     Lab Results   Component Value Date    TSH 1.680 04/24/2023         Assessment        ICD-10-CM ICD-9-CM   1.  Chronic nonintractable headache, unspecified headache type  R51.9 784.0    G89.29    2. Well adult exam  Z00.00 V70.0   3. Screening for HIV (human immunodeficiency virus)  Z11.4 V73.89   4. Need for hepatitis C screening test  Z11.59 V73.89   5. Gastroesophageal reflux disease, unspecified whether esophagitis present  K21.9 530.81   6. Vitamin D deficiency  E55.9 268.9   7. Class 3 severe obesity with body mass index (BMI) of 40.0 to 44.9 in adult, unspecified obesity type, unspecified whether serious comorbidity present  E66.813 278.01    E66.01 V85.41    Z68.41         Plan      Problem List Items Addressed This Visit       Chronic nonintractable headache - Primary (Chronic)    Current Assessment & Plan     MRI brain  Neuro referral pending results  Toradol PRN  ED precations         Relevant Medications    ketorolac (TORADOL) 10 mg tablet    Other Relevant Orders    MRI Brain Without Contrast    Gastroesophageal reflux disease (Chronic)    Current Assessment & Plan     Refilled pepcid  Avoid spicy, acidic, fried foods and alcohol.  Eat 2-3 hours before going to bed.  Avoid tight clothing, chew food thoroughly.  Reduce caffeine intake, avoid soda.             Relevant Medications    famotidine (PEPCID) 40 MG tablet    Class 3 severe obesity with body mass index (BMI) of 40.0 to 44.9 in adult    Current Assessment & Plan     Referral to bariatrics         Relevant Orders    Ambulatory referral/consult to Bariatric/Obesity Medicine     Other Visit Diagnoses       Well adult exam        Relevant Orders    CBC Auto Differential    Comprehensive Metabolic Panel    Hemoglobin A1C    Lipid Panel    TSH    T4, Free    Vitamin D    Urinalysis, Reflex to Urine Culture    Screening for HIV (human immunodeficiency virus)        Relevant Orders    HIV 1/2 Ag/Ab (4th Gen)    Need for hepatitis C screening test        Relevant Orders    Hepatitis C Antibody    Vitamin D deficiency        Relevant Orders    Vitamin D             Future Appointments   Date Time Provider Department Center   11/11/2024  3[facility]   12/3/2024  1:20 PM Stephanie House, MICHEL[facility]        Follow up in about 3 weeks (around 12/2/2024) for Wellness, With labwork prior to visit, Result Review.      Signature:      [facility]  [address] [facility] [address]    Date of encounter: 11/11/24

## 2024-11-11 ENCOUNTER — TELEPHONE (OUTPATIENT)
Dept: INTERNAL MEDICINE | Facility: CLINIC | Age: 29
End: 2024-11-11

## 2024-11-11 ENCOUNTER — LAB VISIT (OUTPATIENT)
Dept: LAB | Facility: HOSPITAL | Age: 29
End: 2024-11-11
Payer: MEDICAID

## 2024-11-11 ENCOUNTER — OFFICE VISIT (OUTPATIENT)
Dept: INTERNAL MEDICINE | Facility: CLINIC | Age: 29
End: 2024-11-11
Payer: MEDICAID

## 2024-11-11 VITALS
WEIGHT: 248.81 LBS | HEIGHT: 63 IN | HEART RATE: 78 BPM | SYSTOLIC BLOOD PRESSURE: 117 MMHG | RESPIRATION RATE: 20 BRPM | DIASTOLIC BLOOD PRESSURE: 77 MMHG | BODY MASS INDEX: 44.09 KG/M2 | TEMPERATURE: 98 F

## 2024-11-11 DIAGNOSIS — E55.9 VITAMIN D DEFICIENCY: ICD-10-CM

## 2024-11-11 DIAGNOSIS — Z11.4 SCREENING FOR HIV (HUMAN IMMUNODEFICIENCY VIRUS): ICD-10-CM

## 2024-11-11 DIAGNOSIS — Z00.00 WELL ADULT EXAM: ICD-10-CM

## 2024-11-11 DIAGNOSIS — E66.813 CLASS 3 SEVERE OBESITY WITH BODY MASS INDEX (BMI) OF 40.0 TO 44.9 IN ADULT, UNSPECIFIED OBESITY TYPE, UNSPECIFIED WHETHER SERIOUS COMORBIDITY PRESENT: ICD-10-CM

## 2024-11-11 DIAGNOSIS — E66.01 CLASS 3 SEVERE OBESITY WITH BODY MASS INDEX (BMI) OF 40.0 TO 44.9 IN ADULT, UNSPECIFIED OBESITY TYPE, UNSPECIFIED WHETHER SERIOUS COMORBIDITY PRESENT: ICD-10-CM

## 2024-11-11 DIAGNOSIS — Z11.59 NEED FOR HEPATITIS C SCREENING TEST: ICD-10-CM

## 2024-11-11 DIAGNOSIS — G89.29 CHRONIC NONINTRACTABLE HEADACHE, UNSPECIFIED HEADACHE TYPE: Primary | Chronic | ICD-10-CM

## 2024-11-11 DIAGNOSIS — R51.9 CHRONIC NONINTRACTABLE HEADACHE, UNSPECIFIED HEADACHE TYPE: Primary | Chronic | ICD-10-CM

## 2024-11-11 DIAGNOSIS — K21.9 GASTROESOPHAGEAL REFLUX DISEASE, UNSPECIFIED WHETHER ESOPHAGITIS PRESENT: Chronic | ICD-10-CM

## 2024-11-11 LAB
25(OH)D3+25(OH)D2 SERPL-MCNC: 10 NG/ML (ref 30–80)
ALBUMIN SERPL-MCNC: 3.8 G/DL (ref 3.5–5)
ALBUMIN/GLOB SERPL: 0.9 RATIO (ref 1.1–2)
ALP SERPL-CCNC: 73 UNIT/L (ref 40–150)
ALT SERPL-CCNC: 18 UNIT/L (ref 0–55)
ANION GAP SERPL CALC-SCNC: 8 MEQ/L
AST SERPL-CCNC: 16 UNIT/L (ref 5–34)
BACTERIA #/AREA URNS AUTO: ABNORMAL /HPF
BASOPHILS # BLD AUTO: 0.08 X10(3)/MCL
BASOPHILS NFR BLD AUTO: 0.7 %
BILIRUB SERPL-MCNC: 0.3 MG/DL
BILIRUB UR QL STRIP.AUTO: NEGATIVE
BUN SERPL-MCNC: 9 MG/DL (ref 7–18.7)
CALCIUM SERPL-MCNC: 9.9 MG/DL (ref 8.4–10.2)
CHLORIDE SERPL-SCNC: 104 MMOL/L (ref 98–107)
CHOLEST SERPL-MCNC: 189 MG/DL
CHOLEST/HDLC SERPL: 5 {RATIO} (ref 0–5)
CLARITY UR: CLEAR
CO2 SERPL-SCNC: 27 MMOL/L (ref 22–29)
COLOR UR AUTO: ABNORMAL
CREAT SERPL-MCNC: 0.76 MG/DL (ref 0.55–1.02)
CREAT/UREA NIT SERPL: 12
EOSINOPHIL # BLD AUTO: 0.3 X10(3)/MCL (ref 0–0.9)
EOSINOPHIL NFR BLD AUTO: 2.6 %
ERYTHROCYTE [DISTWIDTH] IN BLOOD BY AUTOMATED COUNT: 14.3 % (ref 11.5–17)
EST. AVERAGE GLUCOSE BLD GHB EST-MCNC: 111.2 MG/DL
GFR SERPLBLD CREATININE-BSD FMLA CKD-EPI: >60 ML/MIN/1.73/M2
GLOBULIN SER-MCNC: 4.1 GM/DL (ref 2.4–3.5)
GLUCOSE SERPL-MCNC: 92 MG/DL (ref 74–100)
GLUCOSE UR QL STRIP: NORMAL
HBA1C MFR BLD: 5.5 %
HCT VFR BLD AUTO: 40.5 % (ref 37–47)
HCV AB SERPL QL IA: NONREACTIVE
HDLC SERPL-MCNC: 36 MG/DL (ref 35–60)
HGB BLD-MCNC: 13.5 G/DL (ref 12–16)
HGB UR QL STRIP: NEGATIVE
HIV 1+2 AB+HIV1 P24 AG SERPL QL IA: NONREACTIVE
HYALINE CASTS #/AREA URNS LPF: ABNORMAL /LPF
IMM GRANULOCYTES # BLD AUTO: 0.06 X10(3)/MCL (ref 0–0.04)
IMM GRANULOCYTES NFR BLD AUTO: 0.5 %
KETONES UR QL STRIP: NEGATIVE
LDLC SERPL CALC-MCNC: 123 MG/DL (ref 50–140)
LEUKOCYTE ESTERASE UR QL STRIP: NEGATIVE
LYMPHOCYTES # BLD AUTO: 4.82 X10(3)/MCL (ref 0.6–4.6)
LYMPHOCYTES NFR BLD AUTO: 41.6 %
MCH RBC QN AUTO: 27 PG (ref 27–31)
MCHC RBC AUTO-ENTMCNC: 33.3 G/DL (ref 33–36)
MCV RBC AUTO: 81 FL (ref 80–94)
MONOCYTES # BLD AUTO: 0.82 X10(3)/MCL (ref 0.1–1.3)
MONOCYTES NFR BLD AUTO: 7.1 %
NEUTROPHILS # BLD AUTO: 5.51 X10(3)/MCL (ref 2.1–9.2)
NEUTROPHILS NFR BLD AUTO: 47.5 %
NITRITE UR QL STRIP: NEGATIVE
NRBC BLD AUTO-RTO: 0 %
PH UR STRIP: 6.5 [PH]
PLATELET # BLD AUTO: 292 X10(3)/MCL (ref 130–400)
PMV BLD AUTO: 11 FL (ref 7.4–10.4)
POTASSIUM SERPL-SCNC: 3.9 MMOL/L (ref 3.5–5.1)
PROT SERPL-MCNC: 7.9 GM/DL (ref 6.4–8.3)
PROT UR QL STRIP: NEGATIVE
RBC # BLD AUTO: 5 X10(6)/MCL (ref 4.2–5.4)
RBC #/AREA URNS AUTO: ABNORMAL /HPF
SODIUM SERPL-SCNC: 139 MMOL/L (ref 136–145)
SP GR UR STRIP.AUTO: 1.01 (ref 1–1.03)
SQUAMOUS #/AREA URNS LPF: ABNORMAL /HPF
T4 FREE SERPL-MCNC: 0.96 NG/DL (ref 0.7–1.48)
TRIGL SERPL-MCNC: 150 MG/DL (ref 37–140)
TSH SERPL-ACNC: 2.44 UIU/ML (ref 0.35–4.94)
UROBILINOGEN UR STRIP-ACNC: NORMAL
VLDLC SERPL CALC-MCNC: 30 MG/DL
WBC # BLD AUTO: 11.59 X10(3)/MCL (ref 4.5–11.5)
WBC #/AREA URNS AUTO: ABNORMAL /HPF

## 2024-11-11 PROCEDURE — 3074F SYST BP LT 130 MM HG: CPT | Mod: CPTII,,,

## 2024-11-11 PROCEDURE — 99214 OFFICE O/P EST MOD 30 MIN: CPT | Mod: PBBFAC

## 2024-11-11 PROCEDURE — 1160F RVW MEDS BY RX/DR IN RCRD: CPT | Mod: CPTII,,,

## 2024-11-11 PROCEDURE — 3008F BODY MASS INDEX DOCD: CPT | Mod: CPTII,,,

## 2024-11-11 PROCEDURE — 84439 ASSAY OF FREE THYROXINE: CPT

## 2024-11-11 PROCEDURE — 84443 ASSAY THYROID STIM HORMONE: CPT

## 2024-11-11 PROCEDURE — 3078F DIAST BP <80 MM HG: CPT | Mod: CPTII,,,

## 2024-11-11 PROCEDURE — 80053 COMPREHEN METABOLIC PANEL: CPT

## 2024-11-11 PROCEDURE — 85025 COMPLETE CBC W/AUTO DIFF WBC: CPT

## 2024-11-11 PROCEDURE — 80061 LIPID PANEL: CPT

## 2024-11-11 PROCEDURE — 83036 HEMOGLOBIN GLYCOSYLATED A1C: CPT

## 2024-11-11 PROCEDURE — 81001 URINALYSIS AUTO W/SCOPE: CPT

## 2024-11-11 PROCEDURE — 99214 OFFICE O/P EST MOD 30 MIN: CPT | Mod: S$PBB,,,

## 2024-11-11 PROCEDURE — 86803 HEPATITIS C AB TEST: CPT

## 2024-11-11 PROCEDURE — 1159F MED LIST DOCD IN RCRD: CPT | Mod: CPTII,,,

## 2024-11-11 PROCEDURE — 87389 HIV-1 AG W/HIV-1&-2 AB AG IA: CPT

## 2024-11-11 PROCEDURE — 36415 COLL VENOUS BLD VENIPUNCTURE: CPT

## 2024-11-11 PROCEDURE — 82306 VITAMIN D 25 HYDROXY: CPT

## 2024-11-11 RX ORDER — FAMOTIDINE 40 MG/1
40 TABLET, FILM COATED ORAL NIGHTLY
Qty: 30 TABLET | Refills: 6 | Status: SHIPPED | OUTPATIENT
Start: 2024-11-11

## 2024-11-11 RX ORDER — KETOROLAC TROMETHAMINE 10 MG/1
10 TABLET, FILM COATED ORAL EVERY 6 HOURS
COMMUNITY
End: 2024-11-11 | Stop reason: SDUPTHER

## 2024-11-11 RX ORDER — KETOROLAC TROMETHAMINE 10 MG/1
10 TABLET, FILM COATED ORAL EVERY 6 HOURS
Qty: 30 TABLET | Refills: 5 | Status: SHIPPED | OUTPATIENT
Start: 2024-11-11

## 2024-11-11 NOTE — ASSESSMENT & PLAN NOTE
Refilled pepcid  Avoid spicy, acidic, fried foods and alcohol.  Eat 2-3 hours before going to bed.  Avoid tight clothing, chew food thoroughly.  Reduce caffeine intake, avoid soda.

## 2024-11-11 NOTE — TELEPHONE ENCOUNTER
Pharmacy called stating they do not dispense Toradol for headaches. Pharmacy stated Rx for another NSAID can be prescribed. Please advise.

## 2024-11-12 RX ORDER — IBUPROFEN 800 MG/1
800 TABLET ORAL 3 TIMES DAILY PRN
Qty: 60 TABLET | Refills: 5 | Status: SHIPPED | OUTPATIENT
Start: 2024-11-12

## 2024-11-12 NOTE — PROGRESS NOTES
Labs reviewed, will discuss results with patient at their upcoming appointment.     MICHELLE Jiménez

## 2024-12-03 ENCOUNTER — OFFICE VISIT (OUTPATIENT)
Dept: INTERNAL MEDICINE | Facility: CLINIC | Age: 29
End: 2024-12-03
Payer: MEDICAID

## 2024-12-03 VITALS
OXYGEN SATURATION: 98 % | HEIGHT: 63 IN | TEMPERATURE: 98 F | HEART RATE: 79 BPM | RESPIRATION RATE: 16 BRPM | SYSTOLIC BLOOD PRESSURE: 123 MMHG | BODY MASS INDEX: 44.38 KG/M2 | DIASTOLIC BLOOD PRESSURE: 85 MMHG | WEIGHT: 250.5 LBS

## 2024-12-03 DIAGNOSIS — Z00.00 WELL ADULT EXAM: Primary | ICD-10-CM

## 2024-12-03 PROCEDURE — 3079F DIAST BP 80-89 MM HG: CPT | Mod: CPTII,,,

## 2024-12-03 PROCEDURE — 99214 OFFICE O/P EST MOD 30 MIN: CPT | Mod: PBBFAC

## 2024-12-03 PROCEDURE — 1160F RVW MEDS BY RX/DR IN RCRD: CPT | Mod: CPTII,,,

## 2024-12-03 PROCEDURE — 99395 PREV VISIT EST AGE 18-39: CPT | Mod: S$PBB,,,

## 2024-12-03 PROCEDURE — 3074F SYST BP LT 130 MM HG: CPT | Mod: CPTII,,,

## 2024-12-03 PROCEDURE — 3044F HG A1C LEVEL LT 7.0%: CPT | Mod: CPTII,,,

## 2024-12-03 PROCEDURE — 1159F MED LIST DOCD IN RCRD: CPT | Mod: CPTII,,,

## 2024-12-03 PROCEDURE — 3008F BODY MASS INDEX DOCD: CPT | Mod: CPTII,,,

## 2024-12-03 NOTE — PROGRESS NOTES
MICHELLE Gonzalez   [facility]  [address] [facility] [address]      PATIENT NAME: Janet Frank  : 1995  DATE: 12/3/24  MRN: [id number]      Patient PCP Information       Provider PCP Type    MICHELLE Gonzalez General            Reason for Visit / Chief Complaint: Follow-up       History of Present Illness / Problem Focused Workflow     Janet Frank presents to the clinic with Follow-up     30 yo  female presents to clinic. Medical problems include chronic headaches, GERD (prev referred to gastro), vit D deficiency, and obesity    24  Pt presents for routine follow up. She reports headaches 4-5 times per week and frequency and intensity is worsening. She reports no medication helps. Fioricet is not helping, she does take toradol which provides mild relief at times. Will obtain MRI and refer to neurology once completed. She is requesting bariatric referral today for weight loss. She is due for wellness, is fasting today and will complete labs and RTC in a few weeks. Declines vaccines.     12/3/24  Pt presents for wellness, labs reviewed. Vitamin D remains low, she reports she does not want rx vitamin d due to cost. Recommended OTC supplementation, she is agreeable. Informed pt bariatric program has tried to reach her, provided number for her to call and schedule. Also gave number for central scheduling for MRI. Denies complaints. RTC 1 year for wellness          Review of Systems     Review of Systems   Constitutional:  Negative for fatigue, fever and unexpected weight change.   HENT:  Negative for ear pain, hearing loss, trouble swallowing and voice change.    Respiratory:  Negative for cough and shortness of breath.    Cardiovascular:  Negative for chest pain and palpitations.   Gastrointestinal:  Negative for abdominal pain, diarrhea and vomiting.   Genitourinary:  Negative for dysuria.   Musculoskeletal:   "Negative for gait problem.   Skin:  Negative for rash and wound.   Neurological:  Negative for weakness.   Psychiatric/Behavioral:  Negative for suicidal ideas.          Medications and Allergies     Medications  Current Outpatient Medications   Medication Instructions    butalbital-acetaminophen-caffeine -40 mg (FIORICET, ESGIC) -40 mg per tablet 1 tablet, Oral, Every 6 hours PRN    ergocalciferol (ERGOCALCIFEROL) 50,000 Units, Oral, Every 7 days    famotidine (PEPCID) 40 mg, Oral, Nightly    fluticasone propionate (FLONASE) 50 mcg, Each Nostril, Daily    ibuprofen (ADVIL,MOTRIN) 800 mg, Oral, 3 times daily PRN    ketorolac (TORADOL) 10 mg, Oral, Every 6 hours         Allergies  Review of patient's allergies indicates:   Allergen Reactions    Benadryl allergy decongestant Hives    Diphenhydramine hcl Hives       Physical Examination     Visit Vitals  /85 (BP Location: Left arm, Patient Position: Sitting)   Pulse 79   Temp 98.1 °F (36.7 °C) (Oral)   Resp 16   Ht 5' 2.99" (1.6 m)   Wt 113.6 kg (250 lb 8 oz)   LMP 11/25/2024 (Exact Date)   SpO2 98%   BMI 44.39 kg/m²       Physical Exam  Vitals and nursing note reviewed.   Constitutional:       General: She is not in acute distress.     Appearance: She is not ill-appearing.   HENT:      Head: Normocephalic and atraumatic.      Mouth/Throat:      Mouth: Mucous membranes are moist.      Pharynx: Oropharynx is clear.   Eyes:      General: No scleral icterus.     Extraocular Movements: Extraocular movements intact.      Conjunctiva/sclera: Conjunctivae normal.      Pupils: Pupils are equal, round, and reactive to light.   Neck:      Vascular: No carotid bruit.   Cardiovascular:      Rate and Rhythm: Normal rate and regular rhythm.      Heart sounds: No murmur heard.     No friction rub. No gallop.   Pulmonary:      Effort: Pulmonary effort is normal. No respiratory distress.      Breath sounds: Normal breath sounds. No wheezing, rhonchi or rales. "   Abdominal:      General: Abdomen is flat. Bowel sounds are normal. There is no distension.      Palpations: Abdomen is soft. There is no mass.      Tenderness: There is no abdominal tenderness.   Musculoskeletal:         General: Normal range of motion.      Cervical back: Normal range of motion and neck supple.   Skin:     General: Skin is warm and dry.   Neurological:      General: No focal deficit present.      Mental Status: She is alert.   Psychiatric:         Mood and Affect: Mood normal.           Results     Lab Results   Component Value Date    WBC 11.59 (H) 11/11/2024    RBC 5.00 11/11/2024    HGB 13.5 11/11/2024    HCT 40.5 11/11/2024    MCV 81.0 11/11/2024    MCH 27.0 11/11/2024    MCHC 33.3 11/11/2024    RDW 14.3 11/11/2024     11/11/2024    MPV 11.0 (H) 11/11/2024     CMP  Sodium   Date Value Ref Range Status   11/11/2024 139 136 - 145 mmol/L Final     Potassium   Date Value Ref Range Status   11/11/2024 3.9 3.5 - 5.1 mmol/L Final     Chloride   Date Value Ref Range Status   11/11/2024 104 98 - 107 mmol/L Final     CO2   Date Value Ref Range Status   11/11/2024 27 22 - 29 mmol/L Final     Blood Urea Nitrogen   Date Value Ref Range Status   11/11/2024 9.0 7.0 - 18.7 mg/dL Final     Creatinine   Date Value Ref Range Status   11/11/2024 0.76 0.55 - 1.02 mg/dL Final     Calcium   Date Value Ref Range Status   11/11/2024 9.9 8.4 - 10.2 mg/dL Final     Albumin   Date Value Ref Range Status   11/11/2024 3.8 3.5 - 5.0 g/dL Final     Bilirubin Total   Date Value Ref Range Status   11/11/2024 0.3 <=1.5 mg/dL Final     ALP   Date Value Ref Range Status   11/11/2024 73 40 - 150 unit/L Final     AST   Date Value Ref Range Status   11/11/2024 16 5 - 34 unit/L Final     ALT   Date Value Ref Range Status   11/11/2024 18 0 - 55 unit/L Final     Estimated GFR-Non    Date Value Ref Range Status   10/05/2020 >60  Final     Lab Results   Component Value Date    CHOL 189 11/11/2024     Lab Results    Component Value Date    HDL 36 11/11/2024     Lab Results   Component Value Date    TRIG 150 (H) 11/11/2024     Lab Results   Component Value Date    VLDL 30 11/11/2024     Lab Results   Component Value Date    .00 11/11/2024     Lab Results   Component Value Date    TSH 2.436 11/11/2024         Assessment        ICD-10-CM ICD-9-CM   1. Well adult exam  Z00.00 V70.0        Plan      Problem List Items Addressed This Visit       Well adult exam - Primary    Current Assessment & Plan     Pt wellness visit completed today with appropriate lab work.   HM Topics Reviewed / Updated  Immunizations Discussed  Dicussed Healthy Diet &   Encouraged to exercise 3 x weekly  Increase Water Intake  Eat more fruits and vegetables  Avoid soda & alcohol           Relevant Orders    CBC Auto Differential    Comprehensive Metabolic Panel    Hemoglobin A1C    Lipid Panel    TSH    T4, Free    Vitamin D    Urinalysis, Reflex to Urine Culture       Future Appointments   Date Time Provider Department Center   12/16/2025  9:00 AM Stephanie House, [facility]        Follow up in about 54 weeks (around 12/16/2025) for Wellness, With labwork prior to visit.      Signature:      [facility]  [address] [facility] [address]    Date of encounter: 12/3/24

## 2025-03-09 ENCOUNTER — ON-DEMAND VIRTUAL (OUTPATIENT)
Dept: URGENT CARE | Facility: CLINIC | Age: 30
End: 2025-03-09
Payer: MEDICAID

## 2025-03-09 DIAGNOSIS — K08.89 TOOTH PAIN: Primary | ICD-10-CM

## 2025-03-09 PROCEDURE — 98004 SYNCH AUDIO-VIDEO EST SF 10: CPT | Mod: 95,,, | Performed by: NURSE PRACTITIONER

## 2025-03-09 RX ORDER — AMOXICILLIN 875 MG/1
875 TABLET, FILM COATED ORAL EVERY 12 HOURS
Qty: 14 TABLET | Refills: 0 | Status: SHIPPED | OUTPATIENT
Start: 2025-03-09 | End: 2025-03-16

## 2025-03-09 RX ORDER — IBUPROFEN 600 MG/1
600 TABLET ORAL 3 TIMES DAILY
Qty: 30 TABLET | Refills: 0 | Status: SHIPPED | OUTPATIENT
Start: 2025-03-09

## 2025-03-09 NOTE — PROGRESS NOTES
Subjective:      Patient ID: Janet Frank is a 29 y.o. female.    Vitals:  vitals were not taken for this visit.     Chief Complaint: No chief complaint on file.      Visit Type: TELE AUDIOVISUAL - This visit was conducted virtually based on  subjective information and limited objective exam    Present with the patient at the time of consultation: TELEMED PRESENT WITH PATIENT: None  LOCATION OF PATIENT [facility]  Two patient identifiers used to verify patient- saying out date of birth and full name.       Past Medical History:   Diagnosis Date    GERD (gastroesophageal reflux disease)     Headache      No past surgical history on file.  Review of patient's allergies indicates:   Allergen Reactions    Benadryl allergy decongestant Hives    Diphenhydramine hcl Hives     Medications Ordered Prior to Encounter[1]  Family History   Problem Relation Name Age of Onset    Breast cancer Mother      No Known Problems Father      Cervical cancer Maternal Grandmother             [facility] Peq Odvv Intake    3/9/2025  5:15 PM CDT - Filed by Patient   What is your current physical address in the event of a medical emergency? [address]   Are you able to take your vital signs? No   Please attach any relevant images or files    Is your employer contracted with [facility]? No         28 yo female with c/o bottom back right molar pain. She states started hurting last night. She states positive swelling of gums. She denies fever. She states pain 8/10. She states cold and windy and scared to go outside.     Dental Pain       ROS     Objective:   The physical exam was conducted virtually.    AAO x 3 ; no acute distress noted; appearance normal; mood and behavior normal; thought process normal  Head- normocephalic  Nose- appears normal, no discharge or erythema  Eyes- pupils appear normal in size, no drainage, no erythema  Ears- normal appearing; no discharge, no erythema  Mouth- appears normal  Oropharynx- no erythema,  lesions  Lungs- breathing at a normal rate, no acute distress noted  Heart- no reports of tachycardia, palpitations, chest pain  Abdomen- non distended, non tender reported by patient  Skin- warm and dry, no erythema or edema noted by patient or visualized  Psych- as above; no si/hi      Assessment:     No diagnosis found.    Plan:     Follow up with dentist  Ibuprofen 600 mg every 8-12 hours as needed for ftooth pain  Alternating with tylenol 325 mg -650 mg every 4-6 hours (or Tylenol ES (500mg) 1-2 tablets)  Drink plenty of fluids  Rest.       Thank you for choosing [facility]!    Our goal in the [facility] is to always provide outstanding medical care. You may receive a survey by mail or e-mail in the next week regarding your experience today. We would greatly appreciate you completing and returning the survey. Your feedback provides us with a way to recognize our staff who provide very good care, and it helps us learn how to improve when your experience was below our aspiration of excellence.         We appreciate you trusting us with your medical care. We hope you feel better soon. We will be happy to take care of you for all of your future medical needs.    You must understand that you've received an Urgent Care treatment only and that you may be released before all your medical problems are known or treated. You, the patient, will arrange for follow up care as instructed.    Follow up with your PCP or specialty clinic as directed in the next 1-2 weeks if not improved or as needed.  You can call [phone] to schedule an appointment with the appropriate provider.    If your condition worsens we recommend that you receive another evaluation in person, with your primary care provider, urgent care or at the emergency room immediately or contact your primary medical clinics after hours call service to discuss your concerns.         There are no diagnoses linked to this  encounter.                     [1]   Current Outpatient Medications on File Prior to Visit   Medication Sig Dispense Refill    butalbital-acetaminophen-caffeine -40 mg (FIORICET, ESGIC) -40 mg per tablet Take 1 tablet by mouth every 6 (six) hours as needed for Headaches. (Patient not taking: Reported on 12/3/2024) 12 tablet 0    ergocalciferol (ERGOCALCIFEROL) 50,000 unit Cap Take 1 capsule (50,000 Units total) by mouth every 7 days. (Patient not taking: Reported on 12/3/2024) 12 capsule 1    famotidine (PEPCID) 40 MG tablet Take 1 tablet (40 mg total) by mouth every evening. 30 tablet 6    fluticasone propionate (FLONASE) 50 mcg/actuation nasal spray 1 spray (50 mcg total) by Each Nostril route once daily. (Patient not taking: Reported on 8/22/2024) 9.9 mL 0    ibuprofen (ADVIL,MOTRIN) 800 MG tablet Take 1 tablet (800 mg total) by mouth 3 (three) times daily as needed for Pain (headache). 60 tablet 5    ketorolac (TORADOL) 10 mg tablet Take 1 tablet (10 mg total) by mouth every 6 (six) hours. 30 tablet 5     No current facility-administered medications on file prior to visit.

## 2025-06-06 ENCOUNTER — ON-DEMAND VIRTUAL (OUTPATIENT)
Dept: URGENT CARE | Facility: CLINIC | Age: 30
End: 2025-06-06
Payer: MEDICAID

## 2025-06-06 DIAGNOSIS — R51.9 ACUTE NONINTRACTABLE HEADACHE, UNSPECIFIED HEADACHE TYPE: Primary | ICD-10-CM

## 2025-06-06 RX ORDER — SUMATRIPTAN SUCCINATE 25 MG/1
25 TABLET ORAL ONCE
Qty: 4 TABLET | Refills: 0 | Status: SHIPPED | OUTPATIENT
Start: 2025-06-06 | End: 2025-06-06

## 2025-08-11 ENCOUNTER — ON-DEMAND VIRTUAL (OUTPATIENT)
Dept: URGENT CARE | Facility: CLINIC | Age: 30
End: 2025-08-11
Payer: MEDICAID

## 2025-08-11 DIAGNOSIS — H92.01 RIGHT EAR PAIN: Primary | ICD-10-CM

## 2025-08-11 PROCEDURE — 98006 SYNCH AUDIO-VIDEO EST MOD 30: CPT | Mod: 95,,,

## 2025-08-11 RX ORDER — OFLOXACIN 3 MG/ML
5 SOLUTION AURICULAR (OTIC) DAILY
Qty: 10 ML | Refills: 0 | Status: SHIPPED | OUTPATIENT
Start: 2025-08-11 | End: 2025-08-18

## 2025-08-24 ENCOUNTER — ON-DEMAND VIRTUAL (OUTPATIENT)
Dept: URGENT CARE | Facility: CLINIC | Age: 30
End: 2025-08-24
Payer: MEDICAID

## 2025-08-24 DIAGNOSIS — K21.9 GASTROESOPHAGEAL REFLUX DISEASE, UNSPECIFIED WHETHER ESOPHAGITIS PRESENT: Primary | Chronic | ICD-10-CM

## 2025-08-24 PROCEDURE — 98005 SYNCH AUDIO-VIDEO EST LOW 20: CPT | Mod: 95,,, | Performed by: PHYSICIAN ASSISTANT

## 2025-08-24 RX ORDER — OMEPRAZOLE 40 MG/1
40 CAPSULE, DELAYED RELEASE ORAL DAILY
Qty: 30 CAPSULE | Refills: 0 | Status: SHIPPED | OUTPATIENT
Start: 2025-08-24 | End: 2025-09-23

## 2025-08-24 RX ORDER — AZITHROMYCIN 500 MG/1
500 TABLET, FILM COATED ORAL DAILY
Qty: 3 TABLET | Refills: 0 | Status: SHIPPED | OUTPATIENT
Start: 2025-08-24 | End: 2025-08-27

## 2025-08-24 RX ORDER — ONDANSETRON 4 MG/1
4 TABLET, ORALLY DISINTEGRATING ORAL EVERY 8 HOURS PRN
Qty: 15 TABLET | Refills: 0 | Status: SHIPPED | OUTPATIENT
Start: 2025-08-24 | End: 2025-08-29